# Patient Record
Sex: FEMALE | Race: WHITE | NOT HISPANIC OR LATINO | Employment: FULL TIME | URBAN - METROPOLITAN AREA
[De-identification: names, ages, dates, MRNs, and addresses within clinical notes are randomized per-mention and may not be internally consistent; named-entity substitution may affect disease eponyms.]

---

## 2017-05-26 ENCOUNTER — APPOINTMENT (EMERGENCY)
Dept: RADIOLOGY | Facility: HOSPITAL | Age: 27
End: 2017-05-26
Payer: COMMERCIAL

## 2017-05-26 ENCOUNTER — HOSPITAL ENCOUNTER (EMERGENCY)
Facility: HOSPITAL | Age: 27
Discharge: HOME | End: 2017-05-26
Attending: EMERGENCY MEDICINE | Admitting: EMERGENCY MEDICINE
Payer: COMMERCIAL

## 2017-05-26 VITALS
HEART RATE: 98 BPM | DIASTOLIC BLOOD PRESSURE: 70 MMHG | RESPIRATION RATE: 18 BRPM | SYSTOLIC BLOOD PRESSURE: 130 MMHG | TEMPERATURE: 98 F | WEIGHT: 120 LBS | OXYGEN SATURATION: 99 %

## 2017-05-26 DIAGNOSIS — S46.002A ROTATOR CUFF INJURY, LEFT, INITIAL ENCOUNTER: ICD-10-CM

## 2017-05-26 DIAGNOSIS — S46.812A TRAPEZIUS STRAIN, LEFT, INITIAL ENCOUNTER: Primary | ICD-10-CM

## 2017-05-26 PROCEDURE — 73030 X-RAY EXAM OF SHOULDER: CPT

## 2017-05-26 PROCEDURE — 99283 EMERGENCY DEPT VISIT LOW MDM: CPT

## 2017-05-26 PROCEDURE — 71020 HB CHEST X-RAY 2VW FRONTAL&LATL: CPT

## 2017-05-26 RX ORDER — NAPROXEN 500 MG/1
500 TABLET ORAL ONCE
Status: DISCONTINUED | OUTPATIENT
Start: 2017-05-26 | End: 2017-05-26 | Stop reason: HOSPADM

## 2017-05-26 RX ORDER — CYCLOBENZAPRINE HCL 10 MG
10 TABLET ORAL 3 TIMES DAILY PRN
Qty: 30 TABLET | Refills: 0 | Status: SHIPPED | OUTPATIENT
Start: 2017-05-26 | End: 2019-03-06 | Stop reason: ALTCHOICE

## 2017-05-26 RX ORDER — NAPROXEN 500 MG/1
500 TABLET ORAL 2 TIMES DAILY WITH MEALS
Qty: 30 TABLET | Refills: 0 | Status: SHIPPED | OUTPATIENT
Start: 2017-05-26 | End: 2019-03-06 | Stop reason: ALTCHOICE

## 2017-05-26 RX ORDER — NORETHINDRONE AND ETHINYL ESTRADIOL AND FERROUS FUMARATE 0.8-25(24)
KIT ORAL
COMMUNITY
Start: 2011-12-27 | End: 2018-09-27 | Stop reason: SDUPTHER

## 2017-11-07 ENCOUNTER — OFFICE VISIT (OUTPATIENT)
Dept: URGENT CARE | Age: 27
End: 2017-11-07
Payer: COMMERCIAL

## 2017-11-07 PROCEDURE — 87430 STREP A AG IA: CPT | Performed by: FAMILY MEDICINE

## 2017-11-07 PROCEDURE — S9088 SERVICES PROVIDED IN URGENT: HCPCS | Performed by: FAMILY MEDICINE

## 2017-11-07 PROCEDURE — 99203 OFFICE O/P NEW LOW 30 MIN: CPT | Performed by: FAMILY MEDICINE

## 2017-11-08 NOTE — PROGRESS NOTES
Assessment  1  Acute bronchitis (466 0) (J20 9)   2  Acute pharyngitis, unspecified etiology (462) (J02 9)    Plan  Acute pharyngitis, unspecified etiology    · Azithromycin 250 MG Oral Tablet (Zithromax Z-Tony); TAKE 2 TABLETS ON DAY 1  THEN TAKE 1 TABLET A DAY FOR 4 DAYS   · Ventolin  (90 Base) MCG/ACT Inhalation Aerosol Solution; INHALE 1 TO 2  PUFFS EVERY 4 TO 6 HOURS AS NEEDED    Discussion/Summary  Discussion Summary:   Take antibiotic as directed  Recommend daily probiotic while on antibiotic  Push fluids  Rest  If not improving over next 5-7 days recommend follow up with PCP  Warm salt water gargles for sore throat  Throat lozenges as needed  Ibuprofen as needed for sore throat  Understands and agrees with treatment plan: The treatment plan was reviewed with the patient/guardian  The patient/guardian understands and agrees with the treatment plan   Counseling Documentation With Imm: The patient was counseled regarding diagnostic results,-- instructions for management  Follow Up Instructions: Follow Up with your Primary Care Provider in 5-7 days  If your symptoms worsen, go to the nearest [facility] Emergency Department  Chief Complaint  1  Ear Pain   2  Sore Throat  Chief Complaint Free Text Note Form: sinus pressure, sore throat, bilateral ear pain and post nasal drip for 6 days  History of Present Illness  HPI: 51-year-old female that has been feeling poorly since last Wednesday  Started with a sore throat and now has headache ear pain head congestion  Has had persistent fever and chills  Rested for 4 days and states is not getting any better  Has gluten sensitivity /celiac disease  Tried over-the-counter medications but that seemed to bother her [facility] Based Practices Required Assessment:   Pain Assessment   the patient states they have pain   The patient describes the pain as aching  (on a scale of 0 to 10, the patient rates the pain at 5 )   Abuse And Domestic Violence Screen    Yes, the patient is safe at home  -- The patient states no one is hurting them  Depression And Suicide Screen  No, the patient has not had thoughts of hurting themself  No, the patient has not felt depressed in the past 7 days  Prefered Language is  english  Review of Systems  Focused-Female:   Constitutional: fever,-- feeling poorly-- and-- chills  ENT: sore throat-- and-- nasal discharge  Cardiovascular: no complaints of slow or fast heart rate, no chest pain, no palpitations, no leg claudication or lower extremity edema  Respiratory: shortness of breath-- and-- cough, but-- no complaints of shortness of breath, no wheezing, no dyspnea on exertion, no orthopnea or PND  Active Problems  1  Celiac disease (579 0) (K90 0)   2  Contraceptives (V25 02)   3  Dyspepsia (536 8) (K30)   4  Encounter for gynecological examination without abnormal finding (V72 31) (Z01 419)   5  GERD (gastroesophageal reflux disease) (530 81) (K21 9)   6  Other fatigue (780 79) (R53 83)   7  Screening for STD (sexually transmitted disease) (V74 5) (Z11 3)    Past Medical History  1  History of acute gastritis (V12 70) (Z87 19)   2  History of dental abscess (V12 79) (Z87 19)   3  History of Spontaneous  (634 90)   4  History of Spontaneous Pneumothorax (512 8)  Active Problems And Past Medical History Reviewed: The active problems and past medical history were reviewed and updated today  Family History  Father    1  Family history of Hypothyroidism  Brother    2  Family history of Diabetes Mellitus (V18 0)  Maternal Grandfather    3  Family history of liver cancer (V16 0) (Z80 0)   4  Family history of Hypertension (V17 49)  Family History Reviewed: The family history was reviewed and updated today         Social History   · Being A Social Drinker   · History of Current some day smoker (305 1) (F17 200)   · Currently sexually active   · Daily caffeinated coffee consumption   · Exercises regularly   · Never a smoker   · Oral contraceptives   · Denied: History of Uses drugs daily  Social History Reviewed: The social history was reviewed and updated today  Surgical History  1  Contraceptives (V25 02)   2  History of Esophagogastroduodenoscopy   3  History of Thoracoscopy (Therapeutic) With Pleurodesis  Surgical History Reviewed: The surgical history was reviewed and updated today  Current Meds   1  Norethin-Eth Estradiol-Fe 0 8-25 MG-MCG Oral Tablet Chewable; Therapy: ([id number]) to Recorded   2  Probiotic Oral Capsule; take 1 capsule daily; Therapy: 73JXE3096 to (Last Rx:11Nov2015) Ordered  Medication List Reviewed: The medication list was reviewed and updated today  Allergies  1  No Known Drug Allergies  2  Gluten    Vitals  Signs   Recorded: 20KGV8506 05:53PM   Temperature: 98 8 F, Temporal  Heart Rate: 97  Respiration: 20  Systolic: 404  Diastolic: 70  Height: 5 ft 5 in  Weight: 120 lb   BMI Calculated: 19 97  BSA Calculated: 1 59  O2 Saturation: 99  LMP: 19Bou6283  Pain Scale: 5    Physical Exam    Constitutional Well-developed well-nourished female appears acutely ill  Eyes   Conjunctiva and lids: No swelling, erythema or discharge  Pupils and irises: Equal, round and reactive to light  Ears, Nose, Mouth, and Throat   External inspection of ears and nose: Normal     Otoscopic examination: Tympanic membranes translucent with normal light reflex  Canals patent without erythema  -- nasal turbinates red and swollen  -- redness of the pharynx with cobblestoning  Pulmonary   Respiratory effort: No increased work of breathing or signs of respiratory distress  Auscultation of lungs: Abnormal  -- bronchial breath sounds throughout  Cardiovascular   Palpation of heart: Normal PMI, no thrills  Auscultation of heart: Normal rate and rhythm, normal S1 and S2, without murmurs  Skin   Skin and subcutaneous tissue: Normal without rashes or lesions  Psychiatric   Orientation to person, place, and time: Normal     Mood and affect: Normal        Future Appointments    Date/Time Provider Specialty Site   12/19/2017 08:20 AM Ham Vasquez, [address] Obstetrics/Gynecology [facility] OB     Signatures   Electronically signed by :  Lian Gorman; Nov 7 2017  6:14PM EST                       (Author)    Electronically signed by : Lisa Ramsey DO; Nov 7 2017  6:35PM EST                       (Co-author)

## 2017-11-13 ENCOUNTER — ALLSCRIPTS OFFICE VISIT (OUTPATIENT)
Dept: OTHER | Facility: OTHER | Age: 27
End: 2017-11-13

## 2017-11-14 NOTE — PROGRESS NOTES
Assessment    1  Acute bronchitis with bronchospasm (466 0) (J20 9)   2  Celiac disease (579 0) (K90 0)    Plan  Acute bronchitis with bronchospasm    · Benzonatate 100 MG Oral Capsule; tale 1 caps 3 times daily PRN FOR COUGH   · PredniSONE 10 MG Oral Tablet; tale 4 tab daily for 2 days, then 2 tab daily for 2days, then 1 tab daily for 2 days, then stop   · Ventolin  (90 Base) MCG/ACT Inhalation Aerosol Solution; INHALE 1TO 2 PUFFS EVERY 4 TO 6 HOURS AS NEEDED  Celiac disease    · Probiotic Oral Capsule; take 1 capsule daily    Discussion/Summary    1  Acute bronchitis with bronchospasm - patient completed antibiotic therapy with Zithromax yesterday  Will start on Prednisone taper  Use Ventolin HFA inhaler PRN for chest tightness, shortness of breath or wheezing  given for Tessalon 100 mg to take one capsule 3 times daily PRN for cough  fluid intake take  office if symptoms persist or worsen  disease âfollow a gluten-free diet  Take probiotic Align one capsule daily  The patient was counseled regarding instructions for management,-- risk factor reductions,-- risks and benefits of treatment options,-- importance of compliance with treatment  Possible side effects of new medications were reviewed with the patient/guardian today  The treatment plan was reviewed with the patient/guardian  The patient/guardian understands and agrees with the treatment plan      Chief Complaint  Patient seen at urgent care for Acute bronchitis and Acute pharyngitis, completed antibiotics  Complains of chest tightness, shortness of breath, fatigue, head congestion, upset stomach, and diarrhea off and on  History of Present Illness  HPI: Patient presents to the office c/o non- productive cough, chest tightness, SOB, feeling tired, nasal congestion  intermittent diarrhea  was seen at urgent care center on November 7, 2017 for acute bronchitis, acute pharyngitis, started on Zithromax for 5 days  last dose yesterday   She has been using Ventolin HFA inhaler PRN  prior history of asthma  has celiac disease  She takes probiotic Align one capsule daily  Review of Systems   Constitutional: feeling tired-- and-- low grade fever, but-- no chills  ENT: nasal congestion, but-- no earache,-- no nosebleeds,-- no sore throat,-- no hearing loss,-- no nasal discharge-- and-- no hoarseness  Cardiovascular: no chest pain-- and-- no palpitations  Respiratory: shortness of breath, but-- no wheezing--   The patient presents with complaints of cough, described as non-productive  Gastrointestinal: no abdominal pain,-- no nausea-- and-- no vomiting--   The patient presents with complaints of intermittent episodes of diarrhea  Genitourinary: no dysuria  Musculoskeletal: no arthralgias,-- no joint swelling-- and-- no myalgias  Integumentary: no rashes-- and-- no itching  Neurological: no dizziness--   The patient presents with complaints of mild headache  Active Problems  1  Acute bronchitis (466 0) (J20 9)   2  Acute pharyngitis, unspecified etiology (462) (J02 9)   3  Celiac disease (579 0) (K90 0)   4  Contraceptives (V25 02)   5  Dyspepsia (536 8) (K30)   6  Encounter for gynecological examination without abnormal finding (V72 31) (Z01 419)   7  GERD (gastroesophageal reflux disease) (530 81) (K21 9)   8  Other fatigue (780 79) (R53 83)   9  Screening for STD (sexually transmitted disease) (V74 5) (Z11 3)    Past Medical History  1  History of acute gastritis (V12 70) (Z87 19)   2  History of dental abscess (V12 79) (Z87 19)   3  History of Spontaneous  (634 90)   4  History of Spontaneous Pneumothorax (512 8)  Active Problems And Past Medical History Reviewed: The active problems and past medical history were reviewed and updated today  Family History  Father    1  Family history of Hypothyroidism  Brother    2  Family history of Diabetes Mellitus (V18 0)  Maternal Grandfather    3   Family history of liver cancer (V16 0) (Z80 0)   4  Family history of Hypertension (V17 49)    Social History   · Being A Social Drinker   · History of Current some day smoker (305 1) (F17 200)   · Currently sexually active   · Daily caffeinated coffee consumption   · Exercises regularly   · Never a smoker   · Oral contraceptives   · Denied: History of Uses drugs daily  The social history was reviewed and updated today  Surgical History    1  Contraceptives (V25 02)   2  History of Esophagogastroduodenoscopy   3  History of Thoracoscopy (Therapeutic) With Pleurodesis    Current Meds   1  Norethin-Eth Estradiol-Fe 0 8-25 MG-MCG Oral Tablet Chewable; Therapy: (([phone] [phone]) to Recorded   2  Probiotic Oral Capsule; take 1 capsule daily; Therapy: 08JZW5457 to (Last Rx:11Nov2015) Ordered   3  Ventolin  (90 Base) MCG/ACT Inhalation Aerosol Solution; INHALE 1 TO 2 PUFFS EVERY 4 TO 6 HOURS AS NEEDED; Therapy: 42IPX1193 to (Last WK:07MUD0628)  Requested for: 99PUD9580 Ordered    The medication list was reviewed and updated today  Allergies  1  No Known Drug Allergies  2  Gluten    Vitals   Recorded: 54ZPZ2915 11:34AM   Temperature 99 2 F, Tympanic   Heart Rate 88, L Radial   Respiration 16   Systolic 065, LUE   Diastolic 76, LUE   Height 5 ft 5 in   Weight 123 lb    BMI Calculated 20 47   BSA Calculated 1 61   Pain Scale 0       Physical Exam   Constitutional  General appearance: No acute distress, well appearing and well nourished  Eyes  Conjunctiva and lids: No swelling, erythema or discharge  Pupils and irises: Equal, round and reactive to light  Ears, Nose, Mouth, and Throat  External inspection of ears and nose: Normal    Nasal mucosa, septum, and turbinates: Abnormal   The bilateral nasal mucosa was edematous-- and-- red  Oropharynx: Normal with no erythema, edema, exudate or lesions  Pulmonary  Respiratory effort: No increased work of breathing or signs of respiratory distress     Auscultation of lungs: Abnormal  Auscultation of the lungs revealed coarse breath sounds BL  Cardiovascular  Auscultation of heart: Normal rate and rhythm, normal S1 and S2, without murmurs  Examination of extremities for edema and/or varicosities: Normal    Abdomen  Abdomen: Non-tender, no masses  Lymphatic  Palpation of lymph nodes in neck: No lymphadenopathy  Musculoskeletal  Gait and station: Normal    Digits and nails: Normal without clubbing or cyanosis  Inspection/palpation of joints, bones, and muscles: Normal    Skin  Skin and subcutaneous tissue: Normal without rashes or lesions           Future Appointments    Date/Time Provider Specialty Site   12/19/2017 08:20 AM Farida Dickerson [facility] Obstetrics/Gynecology [facility] OB       Signatures   Electronically signed by : DEANA Sunshine ; Nov 13 2017 12:05PM EST                       (Author)

## 2017-12-19 ENCOUNTER — ALLSCRIPTS OFFICE VISIT (OUTPATIENT)
Dept: OTHER | Facility: OTHER | Age: 27
End: 2017-12-19

## 2017-12-19 PROCEDURE — 87591 N.GONORRHOEAE DNA AMP PROB: CPT | Performed by: PHYSICIAN ASSISTANT

## 2017-12-19 PROCEDURE — 87491 CHLMYD TRACH DNA AMP PROBE: CPT | Performed by: PHYSICIAN ASSISTANT

## 2017-12-19 PROCEDURE — G0145 SCR C/V CYTO,THINLAYER,RESCR: HCPCS | Performed by: PHYSICIAN ASSISTANT

## 2017-12-21 ENCOUNTER — LAB REQUISITION (OUTPATIENT)
Dept: LAB | Facility: HOSPITAL | Age: 27
End: 2017-12-21
Payer: COMMERCIAL

## 2017-12-21 DIAGNOSIS — R87.612 LOW GRADE SQUAMOUS INTRAEPITHELIAL LESION ON CYTOLOGIC SMEAR OF CERVIX (LGSIL): ICD-10-CM

## 2017-12-22 LAB
CHLAMYDIA DNA CVX QL NAA+PROBE: NORMAL
N GONORRHOEA DNA GENITAL QL NAA+PROBE: NORMAL

## 2017-12-29 LAB
LAB AP GYN PRIMARY INTERPRETATION: NORMAL
Lab: NORMAL
PATH INTERP SPEC-IMP: NORMAL

## 2018-01-06 NOTE — PROGRESS NOTES
Assessment   1  Encounter for gynecological examination without abnormal finding (V72 31) (Z01 419)   2  Screening for STD (sexually transmitted disease) (V74 5) (Z11 3)   3  Low libido (799 81) (R60 82)    Plan    Encounter for gynecological examination without abnormal finding    · Follow-up visit in 1 year Evaluation and Treatment  Follow-up  Status: Complete  Done:    55YYL6956   Ordered; For: Encounter for gynecological examination without abnormal finding; Ordered By: Odell Hanson Performed:  Due: 91CLM2827; Last Updated By: Feliz Cantu; 12/19/2017 10:25:08 AM  Health Maintenance    · Norethin-Eth Estradiol-Fe 0 8-25 MG-MCG Oral Tablet Chewable; CHEW 1    TABLET DAILY   Rx By: Odell Hanson; Dispense: 90 Days ; #:90 Tablet Chewable; Refill: 3;For: Health Maintenance; DUSTIN = Y; Verified Transmission to [facility][id number] Last Updated By: System[employer] (Chapter 11); 12/19/2017 8:09:37 AM  Pap smear abnormality of cervix with LGSIL    · (1) THIN PREP PAP WITH IMAGING; Status: In Progress - Specimen/Data Collected;      Done: 49LTD1222   Perform:[facility]; Due:82Ubx3910; Ordered; For:Pap smear abnormality of cervix with LGSIL; Ordered By:Moises Wilburn; Maturation index required? : No  HPV? : if ASCUS  Screening for STD (sexually transmitted disease)    · (1) CHLAMYDIA/GC AMPLIFIED DNA, PCR; Source:Vaginal; Status: In Progress -    Specimen/Data Collected;   Done: 78UTY3504   Perform:[facility]; Due:59Yng5646; Ordered; For:Screening for STD (sexually transmitted disease); Ordered By:Moises Wilburn; Follow-up visit in 1 year Evaluation and Treatment  Follow-up  Status: Hold For - Scheduling  Requested for: 83FLQ1798     Ordered;       For: Encounter for gynecological examination without abnormal finding;  Ordered By: Odell Hanson  Performed:   Due: 57ZLE8271       Discussion/Summary   healthy adult female the risks and benefits of cervical cancer screening were discussed Pap test with reflex HPV testing was done today Testing was done today for chlamydia and gonorrhea  Breast cancer screening: the risks and benefits of breast cancer screening were discussed and monthly self breast exam was advised  Advice and education were given regarding weight bearing exercise, calcium supplements and vitamin D supplements  Chief Complaint   Pt is here for her yearly exam, she has no complaints  History of Present Illness   HPI: 24year old white female here for yearly exam, no complaints  She is doing very well on her current birth control and is happy with this  She has celiac disease and this pill is doing well with that  She is engaged to be   She is having a lot of issues with low libido  She remains very active with her boyfriend and 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 She has tried to talk to him about being more romantic  They live with his parents and his bedroom is a pass through so she is always worried about his parents walking into the room while they are having sex  They are trying to get their own home and hopefully that will help     reviewed her Pap history:  8/8/14 LGSIL 12/10/15 WNL   will repeat her Pap today  She elects STD cultures  GYN HM, Adult Female  RoxanaKennan Jhony: The patient is being seen for a health maintenance evaluation  The last health maintenance visit was 1 year(s) ago  General Health:      Lifestyle:  She consumes a diverse and healthy diet  -- She does not have any weight concerns  -- She exercises regularly  -- She does not use tobacco -- She consumes alcohol  She reports occasional alcohol use  -- She denies drug use  Reproductive health: the patient is premenopausal--   she reports no menstrual problems  -- she uses contraception  For contraception, she uses oral contraception pills  -- she is sexually active  -- she denies prior pregnancies      Screening: Cervical cancer screening includes a pap smear performed 12/10/2015,neg  -- and-- no previous human papilloma virus screening  Breast cancer screening includes no previous mammogram  Colorectal cancer screening includes no previous colonoscopy  Active Problems   1  Celiac disease (579 0) (K90 0)   2  Contraceptives (V25 02)   3  Dyspepsia (536 8) (K30)   4  Encounter for gynecological examination without abnormal finding (V72 31) (Z01 419)   5  GERD (gastroesophageal reflux disease) (530 81) (K21 9)   6  Screening for STD (sexually transmitted disease) (V74 5) (Z11 3)    Past Medical History    · History of acute gastritis (V12 70) (Z87 19)   · History of dental abscess (V12 79) (Z87 19)   · History of Spontaneous  (634 90)   · History of Spontaneous Pneumothorax (512 8)    Surgical History    · Contraceptives (V25 02)   · History of Esophagogastroduodenoscopy   · History of Thoracoscopy (Therapeutic) With Pleurodesis    Family History   Father    · Family history of Hypothyroidism  Brother    · Family history of Diabetes Mellitus (V18 0)  Maternal Grandfather    · Family history of liver cancer (V16 0) (Z80 0)   · Family history of Hypertension (V17 49)    Social History    · Being A Social Drinker   · History of Current some day smoker (305 1) (F17 200)   · Currently sexually active   · Daily caffeinated coffee consumption   · Exercises regularly   · Never a smoker   · Oral contraceptives   · Denied: History of Uses drugs daily    Current Meds    1  Norethin-Eth Estradiol-Fe 0 8-25 MG-MCG Oral Tablet Chewable; CHEW 1 TABLET     DAILY  Requested for: 2017; Last Rx:2017 Ordered   2  Probiotic Oral Capsule; take 1 capsule daily; Therapy: 67MAE3855 to (Last Rx:2015) Ordered    Allergies   1  No Known Drug Allergies  2   Gluten    Vitals    Recorded: 85MNO7440 56:25AB   Systolic 061, LUE, Sitting   Diastolic 64, LUE, Sitting   Height 5 ft 5 in   Weight 121 lb    BMI Calculated 20 14   BSA Calculated 1 6   LMP 20PRT4945     Physical Exam        Constitutional      General appearance: No acute distress, well appearing and well nourished  Neck      Neck: Normal, supple, trachea midline, no masses  Genitourinary      External genitalia: Normal and no lesions appreciated  Vagina: Normal, no lesions or dryness appreciated  Urethra: Normal        Urethral meatus: Normal        Bladder: Normal, soft, non-tender and no prolapse or masses appreciated  Cervix: Normal, no palpable masses  Uterus: Normal, non-tender, not enlarged, and no palpable masses  Adnexa/parametria: Normal, non-tender and no fullness or masses appreciated  Chest      Breasts: Normal and no dimpling or skin changes noted  Abdomen      Abdomen: Normal, non-tender, and no organomegaly noted  Lymphatic      Palpation of lymph nodes in neck, axillae, groin and/or other locations: No lymphadenopathy or masses noted         Future Appointments      Date/Time Provider Specialty Site   12/20/2018 08:00 AM Nayeli Roldan [facility] Obstetrics/Gynecology [facility] OB     Signatures    Electronically signed by : Steve Lerner [facility]; Dec 19 2017  9:14AM EST                       (Author)     Electronically signed by : DEANA Villareal ; Jan 5 2018  5:18PM EST                       (Author)

## 2018-01-09 NOTE — MISCELLANEOUS
Message  Return to work or school:   Adarsh Holcomb is under my professional care   She was seen in my office on 11/13/2017   She is able to return to work on  11/16/2017            Signatures   Electronically signed by : DEANA Howard ; Nov 13 2017 12:07PM EST                       (Author)

## 2018-01-13 VITALS
RESPIRATION RATE: 16 BRPM | BODY MASS INDEX: 20.49 KG/M2 | SYSTOLIC BLOOD PRESSURE: 120 MMHG | HEIGHT: 65 IN | DIASTOLIC BLOOD PRESSURE: 76 MMHG | WEIGHT: 123 LBS | TEMPERATURE: 99.2 F | HEART RATE: 88 BPM

## 2018-01-19 ENCOUNTER — GENERIC CONVERSION - ENCOUNTER (OUTPATIENT)
Dept: OTHER | Facility: OTHER | Age: 28
End: 2018-01-19

## 2018-01-22 VITALS
SYSTOLIC BLOOD PRESSURE: 110 MMHG | BODY MASS INDEX: 20.16 KG/M2 | WEIGHT: 121 LBS | DIASTOLIC BLOOD PRESSURE: 64 MMHG | HEIGHT: 65 IN

## 2018-01-23 NOTE — MISCELLANEOUS
Message   Recorded as Task   Date: 2018 07:40 AM, Created By: Jun Preston   Task Name: Go to Result   Assigned To: Jami Apgar   Regarding Patient: Ileana Esqueda, Status: In Progress   Comment:    Shalonda Wilburn - 2018 7:40 AM     TASK CREATED  please let Ning Steve know that her Pap showed a low grade change but the pathologist cannot be sure that something more significant is not going on  She needs a colpo  Her cultures are neg  Kaylen Trujillo - 2018 7:50 AM     TASK IN PROGRESS   Kaylen Trujillo - 2018 7:52 AM     TASK EDITED  [facility] for pt to cb       ext: [id number]   Kaylen Trujillo - 2018 8:05 AM     TASK EDITED  Patient returned call - she is aware of pap results and culture results  Explained to her that she will need a colpo due to the pap results  Explained why she needed it  Explained what a colpo was and how it is done  Advised her to take 2-3 Ibuprofen an hour before hand  Made an appointment for her for  with [facility] in [place]  Active Problems    1  Celiac disease (579 0) (K90 0)   2  Contraceptives (V25 02)   3  Dyspepsia (536 8) (K30)   4  Encounter for gynecological examination without abnormal finding (V72 31) (Z01 419)   5  GERD (gastroesophageal reflux disease) (530 81) (K21 9)   6  Low libido (799 81) (R68 82)   7  Pap smear abnormality of cervix with LGSIL (795 03) (R87 612)   8  Screening for STD (sexually transmitted disease) (V74 5) (Z11 3)    Current Meds   1  Norethin-Eth Estradiol-Fe 0 8-25 MG-MCG Oral Tablet Chewable; CHEW 1 TABLET   DAILY  Requested for: 49HGP6672; Last Rx:66Ouh2278 Ordered   2  Probiotic Oral Capsule; take 1 capsule daily; Therapy: 89GAC6964 to (Last Rx:2015) Ordered    Allergies    1  No Known Drug Allergies    2   Gluten    Signatures   Electronically signed by : Gilford Jourdain, ; 2018  8:05AM EST                       (Author)

## 2018-01-31 ENCOUNTER — OFFICE VISIT (OUTPATIENT)
Dept: OBGYN CLINIC | Facility: CLINIC | Age: 28
End: 2018-01-31
Payer: COMMERCIAL

## 2018-01-31 VITALS — SYSTOLIC BLOOD PRESSURE: 112 MMHG | WEIGHT: 122.4 LBS | DIASTOLIC BLOOD PRESSURE: 60 MMHG | BODY MASS INDEX: 20.37 KG/M2

## 2018-01-31 DIAGNOSIS — R87.612 LGSIL ON PAP SMEAR OF CERVIX: Primary | ICD-10-CM

## 2018-01-31 PROBLEM — J20.9 ACUTE BRONCHITIS WITH BRONCHOSPASM: Status: ACTIVE | Noted: 2017-11-13

## 2018-01-31 PROBLEM — J20.9 ACUTE BRONCHITIS: Status: ACTIVE | Noted: 2017-11-07

## 2018-01-31 PROBLEM — J02.9 ACUTE PHARYNGITIS: Status: ACTIVE | Noted: 2017-11-07

## 2018-01-31 PROBLEM — R68.82 LOW LIBIDO: Status: ACTIVE | Noted: 2017-12-19

## 2018-01-31 PROCEDURE — 88305 TISSUE EXAM BY PATHOLOGIST: CPT | Performed by: OBSTETRICS & GYNECOLOGY

## 2018-01-31 PROCEDURE — 88344 IMHCHEM/IMCYTCHM EA MLT ANTB: CPT | Performed by: OBSTETRICS & GYNECOLOGY

## 2018-01-31 PROCEDURE — 99999 PR OFFICE/OUTPT VISIT,PROCEDURE ONLY: CPT | Performed by: OBSTETRICS & GYNECOLOGY

## 2018-01-31 PROCEDURE — 57454 BX/CURETT OF CERVIX W/SCOPE: CPT | Performed by: OBSTETRICS & GYNECOLOGY

## 2018-01-31 PROCEDURE — 88344 IMHCHEM/IMCYTCHM EA MLT ANTB: CPT | Performed by: PATHOLOGY

## 2018-01-31 PROCEDURE — 88305 TISSUE EXAM BY PATHOLOGIST: CPT | Performed by: PATHOLOGY

## 2018-01-31 RX ORDER — NORETHINDRONE AND ETHINYL ESTRADIOL AND FERROUS FUMARATE 0.8-25(24)
1 KIT ORAL DAILY
COMMUNITY
End: 2019-03-06 | Stop reason: SDUPTHER

## 2018-01-31 NOTE — PROGRESS NOTES
Colposcopy Procedure Note  Indications: Pap smear   December 29, 2017 low-grade squamous intraepithelial neoplasia (LGSIL - encompassing HPV,mild dysplasia,JOSE I)  Prior cervical/vaginal disease: none  Prior cervical treatment none    Procedure Details   The risks and benefits of the procedure and Verbal informed consent obtained  Speculum placed in vagina and excellent visualization of cervix achieved, cervix swabbed x 3 with acetic acid solution  Findings:  Cervix: no visible lesions; cervix swabbed with Lugol's solution  Normal T zone  Vaginal inspection: vaginal colposcopy not performed  Specimens: ECC and cervical biopsy at 12 oclock    Complications: none  Plan:  Specimens labelled and sent to Pathology

## 2018-02-02 ENCOUNTER — TELEPHONE (OUTPATIENT)
Dept: OBGYN CLINIC | Facility: CLINIC | Age: 28
End: 2018-02-02

## 2018-02-07 ENCOUNTER — TELEPHONE (OUTPATIENT)
Dept: OBGYN CLINIC | Facility: CLINIC | Age: 28
End: 2018-02-07

## 2018-02-07 PROBLEM — D06.9 SEVERE DYSPLASIA OF CERVIX (CIN III): Status: ACTIVE | Noted: 2018-02-07

## 2018-02-07 NOTE — TELEPHONE ENCOUNTER
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

## 2018-03-05 ENCOUNTER — PROCEDURE VISIT (OUTPATIENT)
Dept: OBGYN CLINIC | Facility: CLINIC | Age: 28
End: 2018-03-05
Payer: COMMERCIAL

## 2018-03-05 DIAGNOSIS — D06.9 CIN III WITH SEVERE DYSPLASIA: Primary | ICD-10-CM

## 2018-03-05 PROCEDURE — 88307 TISSUE EXAM BY PATHOLOGIST: CPT | Performed by: OBSTETRICS & GYNECOLOGY

## 2018-03-05 PROCEDURE — 57522 CONIZATION OF CERVIX: CPT | Performed by: OBSTETRICS & GYNECOLOGY

## 2018-03-05 PROCEDURE — 88307 TISSUE EXAM BY PATHOLOGIST: CPT | Performed by: PATHOLOGY

## 2018-03-05 NOTE — PATIENT INSTRUCTIONS
Loop Electrosurgical Excision Procedure   WHAT YOU NEED TO KNOW:   A loop electrosurgical excision procedure (LEEP) is used to remove abnormal tissue from your cervix or vagina  Your cervix is the opening of your uterus  Your healthcare provider will use a small wire loop that is heated by an electrical current to remove the tissue  DISCHARGE INSTRUCTIONS:   Medicines: You may need any of the following:  · Acetaminophen  decreases pain  It is available without a doctor's order  Ask how much to take and how often to take it  Follow directions  Acetaminophen can cause liver damage if not taken correctly  · NSAIDs  decrease pain and swelling  This medicine is available without a doctor's order  This medicine can cause stomach bleeding or kidney problems  If you take blood thinner medicine, always ask your healthcare provider if NSAIDs are safe for you  Always read the medicine label and follow the directions on it before you use this medicine  · Take your medicine as directed  Contact your healthcare provider if you think your medicine is not helping or if you have side effects  Tell him if you are allergic to any medicine  Keep a list of the medicines, vitamins, and herbs you take  Include the amounts, and when and why you take them  Bring the list or the pill bottles to follow-up visits  Carry your medicine list with you in case of an emergency  Follow up with your healthcare provider or gynecologist as directed:  Write down your questions so you remember to ask them during your visits  Rest:  Rest when you feel it is needed  Slowly start to do more each day  Return to your daily activities as directed  Vaginal care: It is normal to have mild cramping, spotting, or discharge for several days after your procedure  You may also have a thin, watery discharge for up to 4 weeks after your procedure  Use a clean sanitary pad as needed   Do not  use tampons, douche, or have sex until your healthcare provider or gynecologist says that it is okay  Bathing:  Do not take a bath or use a hot tub for 2 weeks after your procedure, or as directed by your healthcare provider or gynecologist  Reji Rod may shower during this time  Contact your healthcare provider or gynecologist if:   · You have a fever or chills  · You have nausea or are vomiting  · You have blood in your urine  · Your pad becomes soaked with blood  · You have foul-smelling drainage from your vagina  · You have pain when you urinate or have sex  · You have questions or concerns about your condition or care  Seek care immediately or call 911 if:   · You have heavy bleeding from your vagina  · You have severe abdominal or vaginal pain that does not go away, even after you take pain medicine  · You are urinating less than before your procedure  © 2016 [employer] is for End User's use only and may not be sold, redistributed or otherwise used for commercial purposes  All illustrations and images included in CareNotes® are the copyrighted property of [employer]  or Augustus Rivera  The above information is an  only  It is not intended as medical advice for individual conditions or treatments  Talk to your doctor, nurse or pharmacist before following any medical regimen to see if it is safe and effective for you

## 2018-03-29 ENCOUNTER — TELEPHONE (OUTPATIENT)
Dept: OBGYN CLINIC | Facility: CLINIC | Age: 28
End: 2018-03-29

## 2018-03-29 NOTE — TELEPHONE ENCOUNTER
Pt had a leep 3 weeks ago and wanted to know when she may have sex  Said she had no po appt   I gave her an appt next week

## 2018-04-05 ENCOUNTER — OFFICE VISIT (OUTPATIENT)
Dept: OBGYN CLINIC | Facility: CLINIC | Age: 28
End: 2018-04-05

## 2018-04-05 VITALS — WEIGHT: 122.4 LBS | SYSTOLIC BLOOD PRESSURE: 114 MMHG | DIASTOLIC BLOOD PRESSURE: 64 MMHG | BODY MASS INDEX: 20.37 KG/M2

## 2018-04-05 DIAGNOSIS — Z09 POSTOPERATIVE EXAMINATION: Primary | ICD-10-CM

## 2018-04-05 PROCEDURE — 99024 POSTOP FOLLOW-UP VISIT: CPT | Performed by: OBSTETRICS & GYNECOLOGY

## 2018-04-05 NOTE — PROGRESS NOTES
Check after her LEEP procedure  For JOSE 3  Margins were marked at not being free  The patient had no bleeding or discharge her odor      Physical exam:  Normal vulva vagina  Well healed cervix no bleeding  Normal anteverted flexed uterus  No adnexal masses    Impression:  The postop recovery  Status post JOSE 3    Plan:  Pap smear in 6 months for follow-up

## 2018-09-27 DIAGNOSIS — Z01.419 ENCOUNTER FOR GYNECOLOGICAL EXAMINATION WITHOUT ABNORMAL FINDING: Primary | ICD-10-CM

## 2018-09-27 RX ORDER — NORETHINDRONE AND ETHINYL ESTRADIOL AND FERROUS FUMARATE 0.8-25(24)
1 KIT ORAL DAILY
Qty: 90 TABLET | Refills: 2 | Status: SHIPPED | OUTPATIENT
Start: 2018-09-27 | End: 2019-03-06 | Stop reason: ALTCHOICE

## 2018-10-01 ENCOUNTER — TELEPHONE (OUTPATIENT)
Dept: OBGYN CLINIC | Facility: CLINIC | Age: 28
End: 2018-10-01

## 2018-10-01 NOTE — TELEPHONE ENCOUNTER
Pt on a generic of generess fe  Since her LEEP  In May, she has 3 days of spotting mid cycle - every month  No missed or late pills  Change pills?   Thank you

## 2018-10-02 NOTE — TELEPHONE ENCOUNTER
Pt wants to hold off on changing the pill she has celiac disease and is worried on the ingredients , told her she can look up the ingredients but she wants to hold off and see if these bleeding sx work its way out

## 2018-10-02 NOTE — TELEPHONE ENCOUNTER
Please prescribe Loestrin 1 5/30 but make sure she is aware that this is a 21 day pill, not 24 days

## 2019-03-06 ENCOUNTER — ANNUAL EXAM (OUTPATIENT)
Dept: OBGYN CLINIC | Facility: CLINIC | Age: 29
End: 2019-03-06
Payer: COMMERCIAL

## 2019-03-06 VITALS
HEIGHT: 65 IN | SYSTOLIC BLOOD PRESSURE: 116 MMHG | WEIGHT: 131 LBS | BODY MASS INDEX: 21.83 KG/M2 | DIASTOLIC BLOOD PRESSURE: 72 MMHG

## 2019-03-06 DIAGNOSIS — Z01.419 ENCNTR FOR GYN EXAM (GENERAL) (ROUTINE) W/O ABN FINDINGS: ICD-10-CM

## 2019-03-06 PROBLEM — J20.9 ACUTE BRONCHITIS: Status: RESOLVED | Noted: 2017-11-07 | Resolved: 2019-03-06

## 2019-03-06 PROBLEM — J20.9 ACUTE BRONCHITIS WITH BRONCHOSPASM: Status: RESOLVED | Noted: 2017-11-13 | Resolved: 2019-03-06

## 2019-03-06 PROBLEM — J02.9 ACUTE PHARYNGITIS: Status: RESOLVED | Noted: 2017-11-07 | Resolved: 2019-03-06

## 2019-03-06 PROBLEM — R87.612 PAP SMEAR ABNORMALITY OF CERVIX WITH LGSIL: Status: RESOLVED | Noted: 2017-12-19 | Resolved: 2019-03-06

## 2019-03-06 PROCEDURE — 87624 HPV HI-RISK TYP POOLED RSLT: CPT | Performed by: PHYSICIAN ASSISTANT

## 2019-03-06 PROCEDURE — G0145 SCR C/V CYTO,THINLAYER,RESCR: HCPCS | Performed by: PATHOLOGY

## 2019-03-06 PROCEDURE — 99395 PREV VISIT EST AGE 18-39: CPT | Performed by: PHYSICIAN ASSISTANT

## 2019-03-06 PROCEDURE — G0124 SCREEN C/V THIN LAYER BY MD: HCPCS | Performed by: PATHOLOGY

## 2019-03-06 RX ORDER — LORATADINE 10 MG/1
10 TABLET ORAL DAILY
COMMUNITY

## 2019-03-06 RX ORDER — NORETHINDRONE AND ETHINYL ESTRADIOL AND FERROUS FUMARATE 0.8-25(24)
1 KIT ORAL DAILY
Qty: 90 TABLET | Refills: 3 | Status: SHIPPED | OUTPATIENT
Start: 2019-03-06 | End: 2020-02-27 | Stop reason: SDUPTHER

## 2019-03-06 NOTE — PROGRESS NOTES
Tristan Akins  1990    CC:  Yearly exam    S:  34 y o  female here for yearly exam  Her cycles are regular, not heavy or crampy  She is sexually active  She uses Generess generic for contraception  She does not request STD testing today  She has the same boyfriend  We reviewed her history of JOSE II on LEEP in 4/2018 with positive margins; she was supposed to return in 6 months for Pap but never returned - she says that she was called by our office and told she could just come back in a year  We discussed the need to maintain very close follow-up for her abnormal Pap and that if things were to worsen it could impact her ability to have children in the future and that cervical cancer and become a life-threatening disease      She has had Gardasil in the past      Pap 12/19/17 LGSIL cannot r/o HGSIL (treat as ASC-H)  Colpo 1/31/18 JOSE II-III  LEEP 3/5/18 JOSE II, positive margins    Current Outpatient Medications:     loratadine (CLARITIN) 10 mg tablet, Take 10 mg by mouth daily, Disp: , Rfl:     Norethin-Eth Estradiol-Fe 0 8-25 MG-MCG CHEW, Chew 1 tablet daily, Disp: , Rfl:     Probiotic Product (PROBIOTIC DAILY PO), Take 1 capsule by mouth daily, Disp: , Rfl:   Social History     Socioeconomic History    Marital status: Single     Spouse name: Not on file    Number of children: Not on file    Years of education: Not on file    Highest education level: Not on file   Occupational History    Not on file   Social Needs    Financial resource strain: Not on file    Food insecurity:     Worry: Not on file     Inability: Not on file    Transportation needs:     Medical: Not on file     Non-medical: Not on file   Tobacco Use    Smoking status: Current Every Day Smoker     Packs/day: 0 34    Smokeless tobacco: Never Used    Tobacco comment: never a smoker per All[employer]   Substance and Sexual Activity    Alcohol use: Yes     Frequency: Monthly or less     Drinks per session: 1 or 2     Binge frequency: Never     Comment: social drinker per [employer]    Drug use: Never    Sexual activity: Yes     Partners: Male     Birth control/protection: Pill     Comment: contraceptives / oral contraceptives   Lifestyle    Physical activity:     Days per week: Not on file     Minutes per session: Not on file    Stress: Not on file   Relationships    Social connections:     Talks on phone: Not on file     Gets together: Not on file     Attends Scient[employer] service: Not on file     Active member of club or organization: Not on file     Attends meetings of clubs or organizations: Not on file     Relationship status: Not on file    Intimate partner violence:     Fear of current or ex partner: Not on file     Emotionally abused: Not on file     Physically abused: Not on file     Forced sexual activity: Not on file   Other Topics Concern    Not on file   Social History Narrative    Daily caffeinated coffee consumption    Exercises regularly     Family History   Problem Relation Age of Onset    Hypothyroidism Father     Diabetes Brother     Liver cancer Maternal Grandfather     Hypertension Maternal Grandfather      Past Medical History:   Diagnosis Date    Abnormal Pap smear of cervix     Celiac disease     Dental abscess     Spontaneous      Spontaneous pneumothorax     right pneumothorax, treated with right VATS, bleb resection, apical pleurectomy 2012         O:  Blood pressure 116/72, height 5' 5 35" (1 66 m), weight 59 4 kg (131 lb), last menstrual period 2019  Patient appears well and is not in distress  Neck is supple without masses  Breasts are symmetrical without mass, tenderness, nipple discharge, skin changes or adenopathy  Abdomen is soft and nontender without masses  External genitals are normal without lesions or rashes  Vagina is normal without discharge or bleeding  Cervix is normal without discharge or lesion     Uterus is normal, mobile, nontender without palpable mass   Adnexa are normal, nontender, without palpable mass  A:   Yearly exam     History of JOSE III    P:  Pap and HPV collected today  If neg/neg, co-testing in 2    years per [facility] guidelines  If HPV + will need    repeat colposcopy    Generess sent to pharmacy    RTO one year for yearly exam or sooner as needed

## 2019-03-08 LAB
HPV HR 12 DNA CVX QL NAA+PROBE: POSITIVE
HPV16 DNA CVX QL NAA+PROBE: NEGATIVE
HPV18 DNA CVX QL NAA+PROBE: NEGATIVE

## 2019-03-12 ENCOUNTER — TELEPHONE (OUTPATIENT)
Dept: OBGYN CLINIC | Facility: CLINIC | Age: 29
End: 2019-03-12

## 2019-03-12 LAB
LAB AP GYN PRIMARY INTERPRETATION: ABNORMAL
Lab: ABNORMAL
PATH INTERP SPEC-IMP: ABNORMAL

## 2019-03-12 NOTE — TELEPHONE ENCOUNTER
Spoke with pt - she is aware of her pap results and that she needs to have a colpo done  Since she had one done last year, is aware on how it is done  Advised to take 2-3 Ibuprofen an hour before hand  Scheduled her with [facility] for this Thursday in [facility]

## 2019-03-12 NOTE — TELEPHONE ENCOUNTER
----- Message from dE Eldridge PA-C sent at 3/12/2019  2:51 PM EDT -----  Pap with HGSIL, had LEEP last year  Needs repeat colpo

## 2019-03-14 ENCOUNTER — PROCEDURE VISIT (OUTPATIENT)
Dept: OBGYN CLINIC | Facility: CLINIC | Age: 29
End: 2019-03-14
Payer: COMMERCIAL

## 2019-03-14 VITALS — DIASTOLIC BLOOD PRESSURE: 68 MMHG | WEIGHT: 128 LBS | BODY MASS INDEX: 21.07 KG/M2 | SYSTOLIC BLOOD PRESSURE: 138 MMHG

## 2019-03-14 DIAGNOSIS — Z32.02 PREGNANCY TEST NEGATIVE: ICD-10-CM

## 2019-03-14 DIAGNOSIS — B97.7 HPV (HUMAN PAPILLOMA VIRUS) INFECTION: ICD-10-CM

## 2019-03-14 DIAGNOSIS — R87.619 ATYPICAL GLANDULAR CELLS OF UNDETERMINED SIGNIFICANCE (AGUS) ON CERVICAL PAP SMEAR: ICD-10-CM

## 2019-03-14 DIAGNOSIS — R87.613 HGSIL (HIGH GRADE SQUAMOUS INTRAEPITHELIAL LESION) ON PAP SMEAR OF CERVIX: Primary | ICD-10-CM

## 2019-03-14 LAB — SL AMB POCT URINE HCG: NORMAL

## 2019-03-14 PROCEDURE — 57454 BX/CURETT OF CERVIX W/SCOPE: CPT | Performed by: OBSTETRICS & GYNECOLOGY

## 2019-03-14 PROCEDURE — 88344 IMHCHEM/IMCYTCHM EA MLT ANTB: CPT | Performed by: PATHOLOGY

## 2019-03-14 PROCEDURE — 58110 BX DONE W/COLPOSCOPY ADD-ON: CPT | Performed by: OBSTETRICS & GYNECOLOGY

## 2019-03-14 PROCEDURE — 81025 URINE PREGNANCY TEST: CPT | Performed by: OBSTETRICS & GYNECOLOGY

## 2019-03-14 PROCEDURE — 88305 TISSUE EXAM BY PATHOLOGIST: CPT | Performed by: PATHOLOGY

## 2019-03-14 NOTE — PROGRESS NOTES
Endometrial biopsy  Date/Time: 3/14/2019 1:37 PM  Performed by: Jose Eduardo Wayne MD  Authorized by: Jose Eduardo Wayne MD     Consent:     Consent obtained:  Verbal and written    Consent given by:  Patient    Procedural risks discussed:  Bleeding, infection and possible continued pain    Patient questions answered: yes      Patient agrees, verbalizes understanding, and wants to proceed: yes    Indication:     Indications comment:  NILSA on Pap  Procedure:     Procedure: endometrial biopsy with Pipelle      A bivalve speculum was placed in the vagina: yes      Cervix cleaned and prepped: yes      The cervix was dilated: no      Uterus sounded: yes      Uterus sound depth (cm):  9    Specimen collected: specimen collected and sent to pathology      Patient tolerated procedure well with no complications: yes    Findings:     Uterus size:  9-10 weeks    Cervix comment:  Posterior lip shortened  Colposcopy  Date/Time: 3/14/2019 1:39 PM  Performed by: Jose Eduardo Wayne MD  Authorized by: Jose Eduardo Wayne MD     Consent:     Consent obtained:  Verbal and written    Consent given by:  Patient    Procedural risks discussed:  Bleeding, infection and possible continued pain    Patient questions answered: yes      Patient agrees, verbalizes understanding, and wants to proceed: yes    Indication:     Indication:  HSIL  Procedure:     Procedure: Colposcopy w/ cervical biopsy and ECC      [place] speculum was placed in the vagina: yes      Under colposcopic examination the transition zone was seen in entirety: yes      Endocervix was curetted using a Kevorkian curette: yes      Cervical biopsy performed with a cervical biopsy punch: yes      Monsel's solution was applied: yes      Biopsy(s): yes      Location:  ECC and 7 o'clock    Specimen to pathology: yes    Post-procedure:     Findings: Mosaicism, Punctation and White epithelium      Impression: High grade cervical dysplasia      Patient tolerance of procedure:   Tolerated well, no immediate complications

## 2019-03-19 ENCOUNTER — TELEPHONE (OUTPATIENT)
Dept: OBGYN CLINIC | Facility: CLINIC | Age: 29
End: 2019-03-19

## 2019-03-20 ENCOUNTER — CONSULT (OUTPATIENT)
Dept: OBGYN CLINIC | Facility: CLINIC | Age: 29
End: 2019-03-20
Payer: COMMERCIAL

## 2019-03-20 VITALS — BODY MASS INDEX: 21.73 KG/M2 | DIASTOLIC BLOOD PRESSURE: 76 MMHG | SYSTOLIC BLOOD PRESSURE: 124 MMHG | WEIGHT: 132 LBS

## 2019-03-20 DIAGNOSIS — D06.9 SEVERE DYSPLASIA OF CERVIX (CIN III): Primary | ICD-10-CM

## 2019-03-20 PROCEDURE — 99214 OFFICE O/P EST MOD 30 MIN: CPT | Performed by: OBSTETRICS & GYNECOLOGY

## 2019-03-20 NOTE — PROGRESS NOTES
Pre-op History and Physical  Patient is a 34 y o  female scheduled for cervical conization  Indications for procedure are persistent JOSE III with endocervical gland involvement and positive ECC  Patient initially treated by LEEP 2018; however, margins were positive and patient was noncompliant with follow up  Repeat Pap was NILSA  Patient underwent colposcopic-directed biopsies, ECC, and EMB  Pertinent Gynecological History:  Menses: flow is light and with minimal cramping  Bleeding: monthly  Contraception: OCP (estrogen/progesterone)  BALJIT exposure: denies  Blood transfusions: none      Discussed Blood/Blood Products: yes    Menstrual History:  OB History        1    Para        Term                AB   1    Living   0       SAB   1    TAB        Ectopic        Multiple        Live Births                   Patient's last menstrual period was 2019 (exact date)       Past Medical History:   Diagnosis Date    Abnormal Pap smear of cervix     Celiac disease     Dental abscess     Spontaneous      Spontaneous pneumothorax     right pneumothorax, treated with right VATS, bleb resection, apical pleurectomy 2012       Past Surgical History:   Procedure Laterality Date    CERVICAL BIOPSY  W/ LOOP ELECTRODE EXCISION      COLPOSCOPY      ESOPHAGOGASTRODUODENOSCOPY  2015    THORACOSCOPY      (Therapeutic) with pleurodesis         Current Outpatient Medications:     loratadine (CLARITIN) 10 mg tablet, Take 10 mg by mouth daily, Disp: , Rfl:     Norethin-Eth Estradiol-Fe 0 8-25 MG-MCG CHEW, Chew 1 tablet daily, Disp: 90 tablet, Rfl: 3    Probiotic Product (PROBIOTIC DAILY PO), Take 1 capsule by mouth daily, Disp: , Rfl:     Allergies   Allergen Reactions    Gluten Meal        Family History   Problem Relation Age of Onset    Hypothyroidism Father     Diabetes Brother     Liver cancer Maternal Grandfather     Hypertension Maternal Grandfather        Social History Tobacco Use    Smoking status: Current Every Day Smoker     Packs/day: 0 34    Smokeless tobacco: Never Used    Tobacco comment: never a smoker per Allscript   Substance Use Topics    Alcohol use: Yes     Frequency: Monthly or less     Drinks per session: 1 or 2     Binge frequency: Never     Comment: social drinker per Allscript    Drug use: Never       Review of Systems   Constitution: Negative for decreased appetite, fever, malaise/fatigue and weight loss  Hematologic/Lymphatic: Does not bruise/bleed easily  Musculoskeletal: Negative for back pain, joint pain, muscle weakness and myalgias  Gastrointestinal: Negative for bloating, nausea and vomiting  Genitourinary: Negative for flank pain, hesitancy, non-menstrual bleeding, pelvic pain and urgency  Psychiatric/Behavioral: Negative for depression  All other systems reviewed and are negative  Vitals:    03/20/19 0853   BP: 124/76       Physical Exam   Constitutional: She is oriented to person, place, and time  She appears well-developed and well-nourished  Genitourinary: Vagina normal  Cervix does not exhibit lesion, discharge or friability  Genitourinary Comments: Posterior lip of cervix shortened   HENT:   Head: Normocephalic  Cardiovascular: Normal rate, regular rhythm and normal heart sounds  Pulmonary/Chest: Effort normal and breath sounds normal    Abdominal: Soft  She exhibits no distension  There is no tenderness  Lymphadenopathy:        Right: No inguinal adenopathy present  Left: No inguinal adenopathy present  Neurological: She is alert and oriented to person, place, and time  Skin: Skin is warm and dry  Psychiatric: She has a normal mood and affect  Vitals reviewed  Pathology 3/14/2019 from colpo biopsies:  A    Endocervix, curettage:-  Scant atypical squamous epithelium with features most compatible with at least low-grade dysplasia (LSIL/JOSE 1) and scant reactive appearing endocervical glandular epithelium    B   Cervix, 8:00, biopsy:  -  High-grade squamous intraepithelial lesion (HSIL/JOSE 2-3) with background low grade dysplasia (LSIL/JOSE 1) involving the transformation zone with focal involvement of endocervical glands  -  Multiplex immunohistochemical stain performed with appropriate control highlights foci of strong positive staining for p16 with increased Ki-67 proliferative activity into at least the middle portion of the epithelium, supporting the diagnosis    C   Endometrium, biopsy:  -  Benign endometrium with extensive breakdown change, negative for hyperplasia or carcinoma  Impresssion:  Persistent JOSE III with endocervical gland involvement and positive ECC s/p LEEP    Plan:  Cold knife conization of cervix  Discussed risks of bleeding, infection, and pathology requiring further surgery with GYN/ONC  All questions answered  Consent signed

## 2019-03-21 ENCOUNTER — TELEPHONE (OUTPATIENT)
Dept: OBGYN CLINIC | Facility: CLINIC | Age: 29
End: 2019-03-21

## 2019-03-25 ENCOUNTER — TELEPHONE (OUTPATIENT)
Dept: OBGYN CLINIC | Facility: CLINIC | Age: 29
End: 2019-03-25

## 2019-03-27 PROBLEM — R87.613 HIGH GRADE SQUAMOUS INTRAEPITHELIAL CERVICAL DYSPLASIA: Status: ACTIVE | Noted: 2019-03-27

## 2019-03-28 ENCOUNTER — TELEPHONE (OUTPATIENT)
Dept: OBGYN CLINIC | Facility: CLINIC | Age: 29
End: 2019-03-28

## 2019-03-28 NOTE — TELEPHONE ENCOUNTER
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

## 2019-03-29 ENCOUNTER — APPOINTMENT (OUTPATIENT)
Dept: LAB | Age: 29
End: 2019-03-29
Payer: COMMERCIAL

## 2019-03-29 DIAGNOSIS — Z01.818 PREOP TESTING: ICD-10-CM

## 2019-03-29 LAB
ANION GAP SERPL CALCULATED.3IONS-SCNC: 6 MMOL/L (ref 4–13)
BUN SERPL-MCNC: 12 MG/DL (ref 5–25)
CALCIUM SERPL-MCNC: 8.8 MG/DL (ref 8.3–10.1)
CHLORIDE SERPL-SCNC: 106 MMOL/L (ref 100–108)
CO2 SERPL-SCNC: 25 MMOL/L (ref 21–32)
CREAT SERPL-MCNC: 0.79 MG/DL (ref 0.6–1.3)
ERYTHROCYTE [DISTWIDTH] IN BLOOD BY AUTOMATED COUNT: 13.6 % (ref 11.6–15.1)
GFR SERPL CREATININE-BSD FRML MDRD: 104 ML/MIN/1.73SQ M
GLUCOSE P FAST SERPL-MCNC: 107 MG/DL (ref 65–99)
HCT VFR BLD AUTO: 42.3 % (ref 34.8–46.1)
HGB BLD-MCNC: 14.1 G/DL (ref 11.5–15.4)
MCH RBC QN AUTO: 32.3 PG (ref 26.8–34.3)
MCHC RBC AUTO-ENTMCNC: 33.3 G/DL (ref 31.4–37.4)
MCV RBC AUTO: 97 FL (ref 82–98)
PLATELET # BLD AUTO: 336 THOUSANDS/UL (ref 149–390)
PMV BLD AUTO: 9.2 FL (ref 8.9–12.7)
POTASSIUM SERPL-SCNC: 3.5 MMOL/L (ref 3.5–5.3)
RBC # BLD AUTO: 4.36 MILLION/UL (ref 3.81–5.12)
SODIUM SERPL-SCNC: 137 MMOL/L (ref 136–145)
WBC # BLD AUTO: 8.37 THOUSAND/UL (ref 4.31–10.16)

## 2019-03-29 PROCEDURE — 36415 COLL VENOUS BLD VENIPUNCTURE: CPT

## 2019-03-29 PROCEDURE — 85027 COMPLETE CBC AUTOMATED: CPT

## 2019-03-29 PROCEDURE — 80048 BASIC METABOLIC PNL TOTAL CA: CPT

## 2019-03-29 NOTE — TELEPHONE ENCOUNTER
Patient has a LEEP scheduled with you on 4/3  She would like to know how long she should be out from her job

## 2019-04-03 ENCOUNTER — ANESTHESIA EVENT (OUTPATIENT)
Dept: PERIOP | Facility: HOSPITAL | Age: 29
End: 2019-04-03
Payer: COMMERCIAL

## 2019-04-03 ENCOUNTER — HOSPITAL ENCOUNTER (OUTPATIENT)
Facility: HOSPITAL | Age: 29
Setting detail: OUTPATIENT SURGERY
Discharge: HOME | End: 2019-04-03
Attending: OBSTETRICS & GYNECOLOGY | Admitting: OBSTETRICS & GYNECOLOGY
Payer: COMMERCIAL

## 2019-04-03 ENCOUNTER — ANESTHESIA (OUTPATIENT)
Dept: PERIOP | Facility: HOSPITAL | Age: 29
End: 2019-04-03
Payer: COMMERCIAL

## 2019-04-03 VITALS
DIASTOLIC BLOOD PRESSURE: 61 MMHG | SYSTOLIC BLOOD PRESSURE: 114 MMHG | OXYGEN SATURATION: 100 % | HEIGHT: 65 IN | HEART RATE: 73 BPM | TEMPERATURE: 98.4 F | RESPIRATION RATE: 18 BRPM | WEIGHT: 120 LBS | BODY MASS INDEX: 19.99 KG/M2

## 2019-04-03 DIAGNOSIS — Z98.890 S/P CONE BIOPSY OF CERVIX: Primary | ICD-10-CM

## 2019-04-03 DIAGNOSIS — R87.613 HIGH GRADE SQUAMOUS INTRAEPITHELIAL CERVICAL DYSPLASIA: ICD-10-CM

## 2019-04-03 LAB — EXT PREGNANCY TEST URINE: NEGATIVE

## 2019-04-03 PROCEDURE — 88305 TISSUE EXAM BY PATHOLOGIST: CPT | Performed by: PATHOLOGY

## 2019-04-03 PROCEDURE — 88344 IMHCHEM/IMCYTCHM EA MLT ANTB: CPT | Performed by: PATHOLOGY

## 2019-04-03 PROCEDURE — 81025 URINE PREGNANCY TEST: CPT | Performed by: OBSTETRICS & GYNECOLOGY

## 2019-04-03 PROCEDURE — 88307 TISSUE EXAM BY PATHOLOGIST: CPT | Performed by: PATHOLOGY

## 2019-04-03 PROCEDURE — 57522 CONIZATION OF CERVIX: CPT | Performed by: OBSTETRICS & GYNECOLOGY

## 2019-04-03 RX ORDER — PROPOFOL 10 MG/ML
INJECTION, EMULSION INTRAVENOUS CONTINUOUS PRN
Status: DISCONTINUED | OUTPATIENT
Start: 2019-04-03 | End: 2019-04-03 | Stop reason: SURG

## 2019-04-03 RX ORDER — IBUPROFEN 200 MG
600 TABLET ORAL EVERY 6 HOURS PRN
Refills: 0
Start: 2019-04-03 | End: 2020-03-13 | Stop reason: ALTCHOICE

## 2019-04-03 RX ORDER — FENTANYL CITRATE/PF 50 MCG/ML
25 SYRINGE (ML) INJECTION
Status: DISCONTINUED | OUTPATIENT
Start: 2019-04-03 | End: 2019-04-03 | Stop reason: HOSPADM

## 2019-04-03 RX ORDER — LIDOCAINE HYDROCHLORIDE 10 MG/ML
1 INJECTION, SOLUTION EPIDURAL; INFILTRATION; INTRACAUDAL; PERINEURAL ONCE
Status: COMPLETED | OUTPATIENT
Start: 2019-04-03 | End: 2019-04-03

## 2019-04-03 RX ORDER — SODIUM CHLORIDE, SODIUM LACTATE, POTASSIUM CHLORIDE, CALCIUM CHLORIDE 600; 310; 30; 20 MG/100ML; MG/100ML; MG/100ML; MG/100ML
75 INJECTION, SOLUTION INTRAVENOUS CONTINUOUS
Status: DISCONTINUED | OUTPATIENT
Start: 2019-04-03 | End: 2019-04-03

## 2019-04-03 RX ORDER — PROPOFOL 10 MG/ML
INJECTION, EMULSION INTRAVENOUS AS NEEDED
Status: DISCONTINUED | OUTPATIENT
Start: 2019-04-03 | End: 2019-04-03

## 2019-04-03 RX ORDER — PROPOFOL 10 MG/ML
INJECTION, EMULSION INTRAVENOUS AS NEEDED
Status: DISCONTINUED | OUTPATIENT
Start: 2019-04-03 | End: 2019-04-03 | Stop reason: SURG

## 2019-04-03 RX ORDER — FERRIC SUBSULFATE 0.21 G/G
LIQUID TOPICAL AS NEEDED
Status: DISCONTINUED | OUTPATIENT
Start: 2019-04-03 | End: 2019-04-03 | Stop reason: HOSPADM

## 2019-04-03 RX ORDER — MIDAZOLAM HYDROCHLORIDE 1 MG/ML
INJECTION INTRAMUSCULAR; INTRAVENOUS AS NEEDED
Status: DISCONTINUED | OUTPATIENT
Start: 2019-04-03 | End: 2019-04-03 | Stop reason: SURG

## 2019-04-03 RX ORDER — FENTANYL CITRATE 50 UG/ML
INJECTION, SOLUTION INTRAMUSCULAR; INTRAVENOUS AS NEEDED
Status: DISCONTINUED | OUTPATIENT
Start: 2019-04-03 | End: 2019-04-03 | Stop reason: SURG

## 2019-04-03 RX ORDER — ACETAMINOPHEN 325 MG/1
650 TABLET ORAL EVERY 6 HOURS PRN
Status: DISCONTINUED | OUTPATIENT
Start: 2019-04-03 | End: 2019-04-03 | Stop reason: HOSPADM

## 2019-04-03 RX ORDER — SODIUM CHLORIDE, SODIUM LACTATE, POTASSIUM CHLORIDE, CALCIUM CHLORIDE 600; 310; 30; 20 MG/100ML; MG/100ML; MG/100ML; MG/100ML
100 INJECTION, SOLUTION INTRAVENOUS CONTINUOUS
Status: DISCONTINUED | OUTPATIENT
Start: 2019-04-03 | End: 2019-04-03 | Stop reason: HOSPADM

## 2019-04-03 RX ORDER — DEXAMETHASONE SODIUM PHOSPHATE 10 MG/ML
INJECTION, SOLUTION INTRAMUSCULAR; INTRAVENOUS AS NEEDED
Status: DISCONTINUED | OUTPATIENT
Start: 2019-04-03 | End: 2019-04-03 | Stop reason: SURG

## 2019-04-03 RX ORDER — OXYCODONE HYDROCHLORIDE AND ACETAMINOPHEN 5; 325 MG/1; MG/1
1 TABLET ORAL EVERY 4 HOURS PRN
Status: DISCONTINUED | OUTPATIENT
Start: 2019-04-03 | End: 2019-04-03 | Stop reason: HOSPADM

## 2019-04-03 RX ORDER — ONDANSETRON 2 MG/ML
4 INJECTION INTRAMUSCULAR; INTRAVENOUS ONCE AS NEEDED
Status: DISCONTINUED | OUTPATIENT
Start: 2019-04-03 | End: 2019-04-03 | Stop reason: HOSPADM

## 2019-04-03 RX ORDER — IODINE SOLUTION STRONG 5% (LUGOL'S) 5 %
SOLUTION ORAL AS NEEDED
Status: DISCONTINUED | OUTPATIENT
Start: 2019-04-03 | End: 2019-04-03 | Stop reason: HOSPADM

## 2019-04-03 RX ORDER — ONDANSETRON 2 MG/ML
INJECTION INTRAMUSCULAR; INTRAVENOUS AS NEEDED
Status: DISCONTINUED | OUTPATIENT
Start: 2019-04-03 | End: 2019-04-03 | Stop reason: SURG

## 2019-04-03 RX ADMIN — FENTANYL CITRATE 25 MCG: 50 INJECTION, SOLUTION INTRAMUSCULAR; INTRAVENOUS at 15:22

## 2019-04-03 RX ADMIN — SODIUM CHLORIDE, SODIUM LACTATE, POTASSIUM CHLORIDE, AND CALCIUM CHLORIDE 75 ML/HR: .6; .31; .03; .02 INJECTION, SOLUTION INTRAVENOUS at 13:00

## 2019-04-03 RX ADMIN — LIDOCAINE HYDROCHLORIDE 0.5 ML: 10 INJECTION, SOLUTION INFILTRATION; PERINEURAL at 13:00

## 2019-04-03 RX ADMIN — FENTANYL CITRATE 25 MCG: 50 INJECTION, SOLUTION INTRAMUSCULAR; INTRAVENOUS at 15:27

## 2019-04-03 RX ADMIN — PROPOFOL 100 MCG/KG/MIN: 10 INJECTION, EMULSION INTRAVENOUS at 15:15

## 2019-04-03 RX ADMIN — SODIUM CHLORIDE, SODIUM LACTATE, POTASSIUM CHLORIDE, AND CALCIUM CHLORIDE: .6; .31; .03; .02 INJECTION, SOLUTION INTRAVENOUS at 15:12

## 2019-04-03 RX ADMIN — FENTANYL CITRATE 25 MCG: 50 INJECTION, SOLUTION INTRAMUSCULAR; INTRAVENOUS at 15:15

## 2019-04-03 RX ADMIN — SODIUM CHLORIDE, SODIUM LACTATE, POTASSIUM CHLORIDE, AND CALCIUM CHLORIDE 100 ML/HR: .6; .31; .03; .02 INJECTION, SOLUTION INTRAVENOUS at 16:18

## 2019-04-03 RX ADMIN — MIDAZOLAM 2 MG: 1 INJECTION INTRAMUSCULAR; INTRAVENOUS at 15:12

## 2019-04-03 RX ADMIN — PROPOFOL 70 MG: 10 INJECTION, EMULSION INTRAVENOUS at 15:15

## 2019-04-03 RX ADMIN — DEXAMETHASONE SODIUM PHOSPHATE 5 MG: 10 INJECTION, SOLUTION INTRAMUSCULAR; INTRAVENOUS at 15:23

## 2019-04-03 RX ADMIN — FENTANYL CITRATE 25 MCG: 50 INJECTION, SOLUTION INTRAMUSCULAR; INTRAVENOUS at 15:28

## 2019-04-03 RX ADMIN — ONDANSETRON 4 MG: 2 INJECTION INTRAMUSCULAR; INTRAVENOUS at 15:23

## 2019-04-08 ENCOUNTER — TELEPHONE (OUTPATIENT)
Dept: OBGYN CLINIC | Facility: CLINIC | Age: 29
End: 2019-04-08

## 2019-04-22 ENCOUNTER — TELEPHONE (OUTPATIENT)
Dept: OBGYN CLINIC | Facility: CLINIC | Age: 29
End: 2019-04-22

## 2019-04-30 ENCOUNTER — OFFICE VISIT (OUTPATIENT)
Dept: OBGYN CLINIC | Facility: CLINIC | Age: 29
End: 2019-04-30

## 2019-04-30 VITALS — BODY MASS INDEX: 21.17 KG/M2 | DIASTOLIC BLOOD PRESSURE: 70 MMHG | SYSTOLIC BLOOD PRESSURE: 142 MMHG | WEIGHT: 127.2 LBS

## 2019-04-30 DIAGNOSIS — Z09 POSTOP CHECK: Primary | ICD-10-CM

## 2019-04-30 PROCEDURE — 99024 POSTOP FOLLOW-UP VISIT: CPT | Performed by: OBSTETRICS & GYNECOLOGY

## 2019-08-26 ENCOUNTER — OFFICE VISIT (OUTPATIENT)
Dept: URGENT CARE | Age: 29
End: 2019-08-26
Payer: COMMERCIAL

## 2019-08-26 VITALS
BODY MASS INDEX: 20.66 KG/M2 | SYSTOLIC BLOOD PRESSURE: 136 MMHG | DIASTOLIC BLOOD PRESSURE: 73 MMHG | WEIGHT: 124 LBS | HEIGHT: 65 IN | OXYGEN SATURATION: 99 % | TEMPERATURE: 98.6 F | HEART RATE: 79 BPM | RESPIRATION RATE: 20 BRPM

## 2019-08-26 DIAGNOSIS — L42 PITYRIASIS ROSEA: Primary | ICD-10-CM

## 2019-08-26 PROCEDURE — S9088 SERVICES PROVIDED IN URGENT: HCPCS | Performed by: PHYSICIAN ASSISTANT

## 2019-08-26 PROCEDURE — 99213 OFFICE O/P EST LOW 20 MIN: CPT | Performed by: PHYSICIAN ASSISTANT

## 2019-08-27 NOTE — PATIENT INSTRUCTIONS
Avoid heat exposure    Follow-up with the primary care physician as needed    May use topical lotions or ointment as needed

## 2019-10-24 ENCOUNTER — OFFICE VISIT (OUTPATIENT)
Dept: OBGYN CLINIC | Facility: CLINIC | Age: 29
End: 2019-10-24
Payer: COMMERCIAL

## 2019-10-24 VITALS — SYSTOLIC BLOOD PRESSURE: 128 MMHG | WEIGHT: 126.2 LBS | DIASTOLIC BLOOD PRESSURE: 72 MMHG | BODY MASS INDEX: 21 KG/M2

## 2019-10-24 DIAGNOSIS — D06.9 SEVERE DYSPLASIA OF CERVIX (CIN III): Primary | ICD-10-CM

## 2019-10-24 PROCEDURE — G0145 SCR C/V CYTO,THINLAYER,RESCR: HCPCS | Performed by: OBSTETRICS & GYNECOLOGY

## 2019-10-24 PROCEDURE — 99213 OFFICE O/P EST LOW 20 MIN: CPT | Performed by: OBSTETRICS & GYNECOLOGY

## 2019-10-24 NOTE — PROGRESS NOTES
Subjective     Nidhi Nelson is a 34 y o  woman who comes in today for a  pap smear only  Had JOSE III on colpo-directed biopsies followed by negative LEEP/ECC  Contraception: OCP (estrogen/progesterone)    The following portions of the patient's history were reviewed and updated as appropriate: allergies, current medications, past family history, past medical history, past social history, past surgical history and problem list     Review of Systems  Pertinent items are noted in HPI       Objective     /72 (BP Location: Right arm, Patient Position: Sitting, Cuff Size: Standard)   Wt 57 2 kg (126 lb 3 2 oz)   LMP 10/21/2019 (Exact Date)   BMI 21 00 kg/m²   Pelvic Exam: external genitalia normal, vagina normal without discharge and cervix normal in appearance  Pap smear obtained  Assessment/Plan     Screening pap smear  Follow up in 6 months, or as indicated by Pap results

## 2019-10-30 LAB
LAB AP GYN PRIMARY INTERPRETATION: NORMAL
LAB AP LMP: NORMAL
Lab: NORMAL

## 2019-11-01 ENCOUNTER — TELEPHONE (OUTPATIENT)
Dept: OBGYN CLINIC | Facility: CLINIC | Age: 29
End: 2019-11-01

## 2020-02-27 DIAGNOSIS — Z01.419 ENCNTR FOR GYN EXAM (GENERAL) (ROUTINE) W/O ABN FINDINGS: ICD-10-CM

## 2020-02-27 RX ORDER — NORETHINDRONE AND ETHINYL ESTRADIOL AND FERROUS FUMARATE 0.8-25(24)
1 KIT ORAL DAILY
Qty: 28 TABLET | Refills: 0 | Status: SHIPPED | OUTPATIENT
Start: 2020-02-27 | End: 2020-03-13 | Stop reason: SDUPTHER

## 2020-03-13 ENCOUNTER — ANNUAL EXAM (OUTPATIENT)
Dept: OBGYN CLINIC | Facility: CLINIC | Age: 30
End: 2020-03-13
Payer: COMMERCIAL

## 2020-03-13 VITALS
SYSTOLIC BLOOD PRESSURE: 120 MMHG | DIASTOLIC BLOOD PRESSURE: 72 MMHG | WEIGHT: 123 LBS | BODY MASS INDEX: 20.49 KG/M2 | HEIGHT: 65 IN

## 2020-03-13 DIAGNOSIS — Z01.419 ENCNTR FOR GYN EXAM (GENERAL) (ROUTINE) W/O ABN FINDINGS: Primary | ICD-10-CM

## 2020-03-13 DIAGNOSIS — D06.9 SEVERE DYSPLASIA OF CERVIX (CIN III): ICD-10-CM

## 2020-03-13 DIAGNOSIS — Z11.3 SCREEN FOR STD (SEXUALLY TRANSMITTED DISEASE): ICD-10-CM

## 2020-03-13 PROBLEM — R87.613 HIGH GRADE SQUAMOUS INTRAEPITHELIAL CERVICAL DYSPLASIA: Status: RESOLVED | Noted: 2019-03-27 | Resolved: 2020-03-13

## 2020-03-13 PROCEDURE — G0145 SCR C/V CYTO,THINLAYER,RESCR: HCPCS | Performed by: PHYSICIAN ASSISTANT

## 2020-03-13 PROCEDURE — 99395 PREV VISIT EST AGE 18-39: CPT | Performed by: PHYSICIAN ASSISTANT

## 2020-03-13 PROCEDURE — 87624 HPV HI-RISK TYP POOLED RSLT: CPT | Performed by: PHYSICIAN ASSISTANT

## 2020-03-13 PROCEDURE — 87491 CHLMYD TRACH DNA AMP PROBE: CPT | Performed by: PHYSICIAN ASSISTANT

## 2020-03-13 PROCEDURE — 87591 N.GONORRHOEAE DNA AMP PROB: CPT | Performed by: PHYSICIAN ASSISTANT

## 2020-03-13 RX ORDER — NORETHINDRONE AND ETHINYL ESTRADIOL AND FERROUS FUMARATE 0.8-25(24)
1 KIT ORAL DAILY
Qty: 90 TABLET | Refills: 3 | Status: SHIPPED | OUTPATIENT
Start: 2020-03-13 | End: 2021-04-02 | Stop reason: SDUPTHER

## 2020-03-13 NOTE — PROGRESS NOTES
Rasta Payne  1990    CC:  Yearly exam    S:  34 y o  female here for yearly exam  Her cycles are regular, not heavy or crampy  Sexual activity: She is sexually active without pain, bleeding or dryness  Contraception:  She uses Generess for contraception  STD testing:  She does not request STD testing today  We reviewed [facility] guidelines for Pap testing  Pap 12/10/15 neg  Pap 12/19/17 LGSIL r/o HGSIL  Colpo 1/31/18 JOSE 2-3  LEEP 3/5/18 JOSE 2, + margins  Pap 3/6/19 HGSIL, NILSA  Colpo 3/14/19 JOSE I  [facility] bx 4/3/19 benign  Pap 10/24/19 neg (no HPV run)    Family hx of breast cancer: no  Family hx of ovarian cancer: no  Family hx of colon cancer: no    She does not know if she will ever want children, but for now she does not       Current Outpatient Medications:     loratadine (CLARITIN) 10 mg tablet, Take 10 mg by mouth daily, Disp: , Rfl:     Norethin-Eth Estradiol-Fe 0 8-25 MG-MCG CHEW, Chew 1 tablet daily, Disp: 28 tablet, Rfl: 0    Probiotic Product (PROBIOTIC DAILY PO), Take 1 capsule by mouth daily, Disp: , Rfl:   Social History     Socioeconomic History    Marital status: Single     Spouse name: Not on file    Number of children: Not on file    Years of education: Not on file    Highest education level: Not on file   Occupational History    Not on file   Social Needs    Financial resource strain: Not on file    Food insecurity:     Worry: Not on file     Inability: Not on file    Transportation needs:     Medical: Not on file     Non-medical: Not on file   Tobacco Use    Smoking status: Current Every Day Smoker     Packs/day: 0 50    Smokeless tobacco: Never Used   Substance and Sexual Activity    Alcohol use: Yes     Frequency: Never     Drinks per session: 1 or 2     Binge frequency: Never    Drug use: Yes     Types: Marijuana    Sexual activity: Yes     Partners: Male     Birth control/protection: Pill     Comment: contraceptives / oral contraceptives   Lifestyle  Physical activity:     Days per week: Not on file     Minutes per session: Not on file    Stress: Not on file   Relationships    Social connections:     Talks on phone: Not on file     Gets together: Not on file     Attends Mosque service: Not on file     Active member of club or organization: Not on file     Attends meetings of clubs or organizations: Not on file     Relationship status: Not on file    Intimate partner violence:     Fear of current or ex partner: Not on file     Emotionally abused: Not on file     Physically abused: Not on file     Forced sexual activity: Not on file   Other Topics Concern    Not on file   Social History Narrative    Daily caffeinated coffee consumption    Exercises regularly     Family History   Problem Relation Age of Onset    Hypothyroidism Father     Diabetes Brother     Liver cancer Maternal Grandfather     Hypertension Maternal Grandfather      Past Medical History:   Diagnosis Date    Abnormal Pap smear of cervix     Celiac disease     Celiac disease     Dental abscess     HPV (human papilloma virus) infection     Spontaneous      Spontaneous pneumothorax     right pneumothorax, treated with right VATS, bleb resection, apical pleurectomy 2012       Review of Systems   Respiratory: Negative  Cardiovascular: Negative  Gastrointestinal: Negative for constipation and diarrhea  Genitourinary: Negative for difficulty urinating, pelvic pain, vaginal bleeding, vaginal discharge, itching or odor  O:  Blood pressure 120/72, height 5' 4 96" (1 65 m), weight 55 8 kg (123 lb), last menstrual period 03/10/2020  Patient appears well and is not in distress  Neck is supple without masses  Breasts are symmetrical without mass, tenderness, nipple discharge, skin changes or adenopathy  Abdomen is soft and nontender without masses  External genitals are normal without lesions or rashes    Urethra and urethral meatus are normal  Bladder is normal to palpation  Vagina is normal without discharge or bleeding  Cervix is normal without discharge or lesion  Uterus is normal, mobile, nontender without palpable mass  Adnexa are normal, nontender, without palpable mass  A:   Yearly exam     Hx JOSE III    P:   Pap and HPV today   Generess sent to pharmacy     RTO one year for yearly exam or sooner as needed

## 2020-03-15 LAB
HPV HR 12 DNA CVX QL NAA+PROBE: NEGATIVE
HPV16 DNA CVX QL NAA+PROBE: NEGATIVE
HPV18 DNA CVX QL NAA+PROBE: NEGATIVE

## 2020-03-16 LAB
C TRACH DNA SPEC QL NAA+PROBE: NEGATIVE
N GONORRHOEA DNA SPEC QL NAA+PROBE: NEGATIVE

## 2020-03-17 LAB
LAB AP GYN PRIMARY INTERPRETATION: NORMAL
Lab: NORMAL

## 2020-10-06 NOTE — PROGRESS NOTES
Jerrlyn Peabody  YOB: 1990        Preop Diagnosis: JOSE 2-3    Postop Diagnosis : same    Anesthesia: IV propofol, IV ketorolac    Procedure: LEEP    Specimen: ectocervix for routine pathologic studies  Loop electrode size: 25 by 15 mm    Blood loss was: minimal    Prior to procedure informed consent was obtained, alternatives were discussed, patient's questions were answered    In the dorsal lithotomy position, anesthesia was induced with IV propofol by the nurse anesthesist    Bimanual exam was done with normal findings  The vulva and vagina were prepped with betadine solution    A Leep bivalve speculum with smoke evacuation tube was placed in the vagina  The cervix was identified  Acetic acid 5% was applied, Lugol's solution was then applied as well  Infiltration of the cervical stroma circumferentially with lidocaine 2% with epinephrine for a total of 5 ml was performed  The loop electrode with monopolar cutting was used to excise the affected area of the cervix  The specimen was handed over  Monopolar electro cautery was applied to the defect on the cervix for hemostasis  Monsel's solution was applied as well  Hemostasis was present  The instruments were removed  Ramón Willett was awakened of anesthesia  She tolerated the procedure well  Ary name: Manohar Aguayo  Age 21 yrs    Indication for ICU admission:  Spinal drain  Admit Date:  10/2  ICU Date:      10/3  OR Date:      10/3    No Known Allergies    PAST MEDICAL & SURGICAL HISTORY:  Anxiety  S/P ACL repair  x2  History of tonsillectomy  Home Medications:       22 y/o male with recurrent CSF leak on POD #7  (had been repaired at time of initial surgery)  Now s/p primary repair of dura , application of duraseal and a fat plug  A spinal drain was placed for intermittant drainage of CSF    24 HOUR EVENTS:  ON: No acute events, got Clonapin in 6pm, low grade tachy to 106, gave lactulose 10 x1. Cr remains 1.0.     During the Day:  HOB to 90 degrees  Continued hourly drainage for spinal drain with goal of 10 cc/hr  Clonidine 0.5 mg for anxiety ( home med)      DVT PTX: heparin   Injectable 7500 Unit(s) SubCutaneous every 8 hours    GI PTX: pantoprazole    Tablet 40 milliGRAM(s) Oral before breakfast    ***Tubes/Lines/Drains  ***  Peripheral IV    [ x]A ten-point review of systems was otherwise negative except as noted above.  [ ]Due to altered mental status/intubation, subjective information was not attained from the patient.  History was obtained, to the extent possible, from review of the chart and collateral sources of information.    Ary name: Manohar Aguayo  Age 21 yrs    Indication for ICU admission:  Spinal drain  Admit Date:  10/2  ICU Date:      10/3  OR Date:      10/3    No Known Allergies    PAST MEDICAL & SURGICAL HISTORY:  Anxiety  S/P ACL repair  x2  History of tonsillectomy  Home Medications:       20 y/o male with recurrent CSF leak on POD #11 (had been repaired at time of initial surgery)  Now POD#3 s/p primary repair of dura , application of duraseal and a fat plug  A spinal drain was placed for intermittant drainage of CSF    24 HOUR EVENTS:  ON: No acute events, got Clonapin in 6pm, low grade tachy to 106, gave lactulose 10 x1. Cr remains 1.0.     During the Day:  HOB to 90 degrees  Continued hourly drainage for spinal drain with goal of 10 cc/hr  Clonidine 0.5 mg for anxiety ( home med)      DVT PTX: heparin   Injectable 7500 Unit(s) SubCutaneous every 8 hours    GI PTX: pantoprazole    Tablet 40 milliGRAM(s) Oral before breakfast    ***Tubes/Lines/Drains  ***  Peripheral IV    [ x]A ten-point review of systems was otherwise negative except as noted above.  [ ]Due to altered mental status/intubation, subjective information was not attained from the patient.  History was obtained, to the extent possible, from review of the chart and collateral sources of information.       Daily     Daily Weight in k (06 Oct 2020 05:00)    Diet, Regular (10-03-20 @ 12:55)      CURRENT MEDS:  Neurologic Medications  acetaminophen   Tablet .. 650 milliGRAM(s) Oral every 6 hours  cyclobenzaprine 10 milliGRAM(s) Oral <User Schedule> PRN Muscle Spasm  diazepam    Tablet 5 milliGRAM(s) Oral <User Schedule>  melatonin 5 milliGRAM(s) Oral at bedtime  oxyCODONE    IR 5 milliGRAM(s) Oral every 6 hours PRN Moderate Pain (4 - 6)  oxyCODONE    IR 10 milliGRAM(s) Oral every 6 hours PRN Severe Pain (7 - 10)    Respiratory Medications    Cardiovascular Medications    Gastrointestinal Medications  bisacodyl 5 milliGRAM(s) Oral at bedtime  lactulose Syrup 10 Gram(s) Oral every 8 hours  multivitamin 1 Tablet(s) Oral daily  pantoprazole    Tablet 40 milliGRAM(s) Oral before breakfast  polyethylene glycol 3350 17 Gram(s) Oral daily  senna 2 Tablet(s) Oral at bedtime    Genitourinary Medications    Hematologic/Oncologic Medications  heparin   Injectable 7500 Unit(s) SubCutaneous every 8 hours  influenza   Vaccine 0.5 milliLiter(s) IntraMuscular once    Antimicrobial/Immunologic Medications  ceFAZolin   IVPB 1000 milliGRAM(s) IV Intermittent every 8 hours    Endocrine/Metabolic Medications    Topical/Other Medications  chlorhexidine 4% Liquid 1 Application(s) Topical <User Schedule>      ICU Vital Signs Last 24 Hrs  T(C): 35.7 (06 Oct 2020 08:00), Max: 37.3 (05 Oct 2020 20:00)  T(F): 96.2 (06 Oct 2020 08:00), Max: 99.2 (05 Oct 2020 20:00)  HR: 99 (06 Oct 2020 11:15) (80 - 124)  BP: 148/76 (06 Oct 2020 11:15) (114/62 - 162/88)  BP(mean): 104 (06 Oct 2020 11:15) (81 - 111)  RR: 18 (06 Oct 2020 08:00) (16 - 22)  SpO2: 100% (06 Oct 2020 11:15) (97% - 100%)      Adult Advanced Hemodynamics Last 24 Hrs      I&O's Summary    05 Oct 2020 07:  -  06 Oct 2020 07:00  --------------------------------------------------------  IN: 1240 mL / OUT: 2995 mL / NET: -1755 mL    06 Oct 2020 07:  -  06 Oct 2020 11:37  --------------------------------------------------------  IN: 0 mL / OUT: 40 mL / NET: -40 mL      I&O's Detail    05 Oct 2020 07:01  -  06 Oct 2020 07:00  --------------------------------------------------------  IN:    Oral Fluid: 1240 mL  Total IN: 1240 mL    OUT:    External Ventricular Device (mL): 245 mL    Voided (mL): 2750 mL  Total OUT: 2995 mL    Total NET: -1755 mL      06 Oct 2020 07:01  -  06 Oct 2020 11:37  --------------------------------------------------------  IN:  Total IN: 0 mL    OUT:    External Ventricular Device (mL): 40 mL  Total OUT: 40 mL    Total NET: -40 mL          PHYSICAL EXAM:    General/Neuro  GCS: 15  Alert & oriented x 3, no focal deficits. No acute distress       Lungs: Clear to auscultation, Normal expansion/effort.     Cardiovascular : S1, S2.  Regular rate and rhythm.  Peripheral edema       GI: Abdomen soft, Non-tender, Non-distended.  BS+    Wound: Lumbar drain in place. No surrounding erythema or swelling     Extremities: Extremities warm, pink, well-perfused. 5/5 strength in BUE and 4/5 BLE    Derm: Good skin turgor, no skin breakdown.      : Deferred. Voiding       CXR:     LABS:  CAPILLARY BLOOD GLUCOSE                              13.3   12.69 )-----------( 291      ( 06 Oct 2020 00:09 )             39.8       10-    137  |  105  |  23<H>  ----------------------------<  113<H>  4.3   |  21  |  1.0    Ca    9.1      06 Oct 2020 00:09  Phos  4.5     10-  Mg     1.9     10-06        PT/INR - ( 05 Oct 2020 00:10 )   PT: 12.00 sec;   INR: 1.04 ratio

## 2021-03-19 ENCOUNTER — TELEPHONE (OUTPATIENT)
Dept: OBGYN CLINIC | Facility: CLINIC | Age: 31
End: 2021-03-19

## 2021-03-19 NOTE — TELEPHONE ENCOUNTER
Spoke with mariela tenorio with [facility] 03/23   Last cycle ended over 1 week ago  Will wait till apt for refil and instructions as to when to start

## 2021-03-19 NOTE — TELEPHONE ENCOUNTER
Patient called and would like a refill on her Birth Control  She ran out last week   Pharmacy and call back number still valid

## 2021-03-22 ENCOUNTER — TELEPHONE (OUTPATIENT)
Dept: OBGYN CLINIC | Facility: CLINIC | Age: 31
End: 2021-03-22

## 2021-03-22 DIAGNOSIS — Z01.419 ENCNTR FOR GYN EXAM (GENERAL) (ROUTINE) W/O ABN FINDINGS: ICD-10-CM

## 2021-03-22 NOTE — TELEPHONE ENCOUNTER
Luz Mackenzie had to som pts appt from 3/23 til 4/7 with Sandy Silverman,  pls refill BC until then,  Thanks

## 2021-03-23 ENCOUNTER — TELEPHONE (OUTPATIENT)
Dept: OBGYN CLINIC | Facility: CLINIC | Age: 31
End: 2021-03-23

## 2021-03-23 RX ORDER — NORETHINDRONE AND ETHINYL ESTRADIOL AND FERROUS FUMARATE 0.8-25(24)
1 KIT ORAL DAILY
Qty: 28 TABLET | Refills: 0 | Status: CANCELLED | OUTPATIENT
Start: 2021-03-23

## 2021-04-02 RX ORDER — NORETHINDRONE AND ETHINYL ESTRADIOL AND FERROUS FUMARATE 0.8-25(24)
1 KIT ORAL DAILY
Qty: 90 TABLET | Refills: 0 | Status: SHIPPED | OUTPATIENT
Start: 2021-04-02 | End: 2021-05-18 | Stop reason: SDUPTHER

## 2021-04-02 NOTE — TELEPHONE ENCOUNTER
Please no ensure history of deep vein thrombosis, pulmonary embolism, stroke, hypertension, breast cancer, or migraines with aura  Review slightly increased risk of the above with estrogen-containing pills  If any history of the above, do not fill Rx, schedule for outpatient appointment to discuss alternate forms  If no history of the above, okay to fill

## 2021-04-02 NOTE — TELEPHONE ENCOUNTER
Pt rescheduled her yearly with Ramona Hammans on 5/18 because she is still out of the office next week  She is out of her birth control and would like a refill sent to her pharmacy

## 2021-05-11 ENCOUNTER — HOSPITAL ENCOUNTER (EMERGENCY)
Facility: HOSPITAL | Age: 31
Discharge: HOME | End: 2021-05-11
Attending: EMERGENCY MEDICINE | Admitting: EMERGENCY MEDICINE
Payer: COMMERCIAL

## 2021-05-11 VITALS
SYSTOLIC BLOOD PRESSURE: 138 MMHG | HEIGHT: 65 IN | HEART RATE: 103 BPM | WEIGHT: 118 LBS | TEMPERATURE: 98.2 F | DIASTOLIC BLOOD PRESSURE: 79 MMHG | OXYGEN SATURATION: 99 % | BODY MASS INDEX: 19.66 KG/M2 | RESPIRATION RATE: 18 BRPM

## 2021-05-11 DIAGNOSIS — K04.7 DENTAL ABSCESS: ICD-10-CM

## 2021-05-11 DIAGNOSIS — K08.89 PAIN, DENTAL: Primary | ICD-10-CM

## 2021-05-11 PROCEDURE — 99282 EMERGENCY DEPT VISIT SF MDM: CPT

## 2021-05-11 PROCEDURE — 99284 EMERGENCY DEPT VISIT MOD MDM: CPT | Performed by: EMERGENCY MEDICINE

## 2021-05-11 RX ORDER — CLINDAMYCIN HYDROCHLORIDE 150 MG/1
300 CAPSULE ORAL ONCE
Status: COMPLETED | OUTPATIENT
Start: 2021-05-11 | End: 2021-05-11

## 2021-05-11 RX ORDER — CLINDAMYCIN HYDROCHLORIDE 300 MG/1
300 CAPSULE ORAL 4 TIMES DAILY
Qty: 28 CAPSULE | Refills: 0 | Status: SHIPPED | OUTPATIENT
Start: 2021-05-11 | End: 2021-05-18

## 2021-05-11 RX ADMIN — CLINDAMYCIN HYDROCHLORIDE 300 MG: 150 CAPSULE ORAL at 05:24

## 2021-05-11 NOTE — ED PROVIDER NOTES
History  Chief Complaint   Patient presents with    Dental Pain     left lower mouth pain       30 yo Female  Here for dental pain and left lower submandibular swelling  This has been going on for several days    No fever/chills  No n/v        Dental Pain  Location:  Lower  Lower teeth location:  17/LL 3rd molar  Quality:  Aching  Severity:  Moderate  Onset quality:  Gradual  Relieved by:  NSAIDs  Associated symptoms: facial swelling    Associated symptoms: no congestion, no difficulty swallowing, no drooling, no facial pain, no fever, no headaches, no neck pain, no neck swelling, no oral bleeding, no oral lesions and no trismus        Prior to Admission Medications   Prescriptions Last Dose Informant Patient Reported? Taking?    Norethin-Eth Estradiol-Fe 0 8-25 MG-MCG CHEW   No No   Sig: Chew 1 tablet daily   Probiotic Product (PROBIOTIC DAILY PO)  Self Yes No   Sig: Take 1 capsule by mouth daily   loratadine (CLARITIN) 10 mg tablet  Self Yes No   Sig: Take 10 mg by mouth daily      Facility-Administered Medications: None       Past Medical History:   Diagnosis Date    Abnormal Pap smear of cervix     Celiac disease     Celiac disease     Dental abscess     HPV (human papilloma virus) infection     Spontaneous      Spontaneous pneumothorax     right pneumothorax, treated with right VATS, bleb resection, apical pleurectomy 2012       Past Surgical History:   Procedure Laterality Date    CERVICAL BIOPSY N/A 4/3/2019    Procedure: BIOPSY CONE/COLD KNIFE CERVIX;  Surgeon: Chayo Pratt MD;  Location: [facility];  Service: Gynecology    CERVICAL BIOPSY  W/ LOOP ELECTRODE EXCISION  2018    COLPOSCOPY      ESOPHAGOGASTRODUODENOSCOPY  2015    THORACOSCOPY      (Therapeutic) with pleurodesis       Family History   Problem Relation Age of Onset    Hypothyroidism Father     Diabetes Brother     Liver cancer Maternal Grandfather     Hypertension Maternal Grandfather      I have reviewed and agree with the history as documented  E-Cigarette/Vaping    E-Cigarette Use Never User      E-Cigarette/Vaping Substances     Social History     Tobacco Use    Smoking status: Current Every Day Smoker     Packs/day: 0 50    Smokeless tobacco: Never Used   Substance Use Topics    Alcohol use: Yes     Frequency: Never     Drinks per session: 1 or 2     Binge frequency: Never    Drug use: Not Currently       Review of Systems   Constitutional: Negative for chills, diaphoresis, fatigue and fever  HENT: Positive for dental problem and facial swelling  Negative for congestion, drooling, mouth sores, rhinorrhea, sinus pressure, sore throat, trouble swallowing and voice change  Respiratory: Negative for cough, shortness of breath, wheezing and stridor  Cardiovascular: Negative for chest pain, palpitations and leg swelling  Gastrointestinal: Negative for abdominal pain, blood in stool, nausea and vomiting  Genitourinary: Negative for difficulty urinating, dysuria, flank pain and frequency  Musculoskeletal: Negative for arthralgias, back pain, gait problem, joint swelling, myalgias, neck pain and neck stiffness  Skin: Negative for rash and wound  Neurological: Negative for dizziness, light-headedness and headaches  All other systems reviewed and are negative  Physical Exam  Physical Exam  Constitutional:       General: She is not in acute distress  Appearance: She is well-developed  She is not ill-appearing, toxic-appearing or diaphoretic  HENT:      Head: Normocephalic and atraumatic  Ears:      Comments: Submandibular LA, left submandibular region      Nose: Nose normal       Mouth/Throat:      Mouth: Mucous membranes are moist       Pharynx: No oropharyngeal exudate  Comments: ttp over the left lower molar   Eyes:      General: No scleral icterus  Right eye: No discharge  Left eye: No discharge        Conjunctiva/sclera: Conjunctivae normal  Pupils: Pupils are equal, round, and reactive to light  Neck:      Musculoskeletal: Normal range of motion and neck supple  No neck rigidity or muscular tenderness  Vascular: No JVD  Trachea: No tracheal deviation  Cardiovascular:      Rate and Rhythm: Normal rate and regular rhythm  Heart sounds: Normal heart sounds  No murmur  No friction rub  No gallop  Pulmonary:      Effort: Pulmonary effort is normal  No respiratory distress  Breath sounds: No stridor  Abdominal:      Palpations: Abdomen is soft  Musculoskeletal: Normal range of motion  General: No swelling, tenderness, deformity or signs of injury  Right lower leg: No edema  Left lower leg: No edema  Lymphadenopathy:      Cervical: No cervical adenopathy  Skin:     General: Skin is warm  Capillary Refill: Capillary refill takes less than 2 seconds  Coloration: Skin is not jaundiced or pale  Findings: No bruising, erythema, lesion or rash  Neurological:      General: No focal deficit present  Mental Status: She is alert and oriented to person, place, and time  Mental status is at baseline  Cranial Nerves: No cranial nerve deficit  Sensory: No sensory deficit  Motor: No weakness or abnormal muscle tone  Coordination: Coordination normal    Psychiatric:         Behavior: Behavior normal          Thought Content:  Thought content normal          Judgment: Judgment normal          Vital Signs  ED Triage Vitals [05/11/21 0506]   Temperature Pulse Respirations Blood Pressure SpO2   98 2 °F (36 8 °C) 103 18 138/79 99 %      Temp Source Heart Rate Source Patient Position - Orthostatic VS BP Location FiO2 (%)   Temporal Monitor Sitting Left arm --      Pain Score       4           Vitals:    05/11/21 0506   BP: 138/79   Pulse: 103   Patient Position - Orthostatic VS: Sitting         Visual Acuity      ED Medications  Medications - No data to display    Diagnostic Studies  Results Reviewed     None                 No orders to display              Procedures  Procedures         ED Course                                           MDM    Disposition  Final diagnoses:   None     ED Disposition     None      Follow-up Information    None         Patient's Medications   Discharge Prescriptions    No medications on file     No discharge procedures on file      PDMP Review     None          ED Provider  Electronically Signed by           Byron Kiran MD  05/11/21 5325

## 2021-05-18 ENCOUNTER — ANNUAL EXAM (OUTPATIENT)
Dept: OBGYN CLINIC | Facility: CLINIC | Age: 31
End: 2021-05-18
Payer: COMMERCIAL

## 2021-05-18 VITALS
WEIGHT: 116.6 LBS | HEIGHT: 66 IN | BODY MASS INDEX: 18.74 KG/M2 | DIASTOLIC BLOOD PRESSURE: 64 MMHG | SYSTOLIC BLOOD PRESSURE: 120 MMHG

## 2021-05-18 DIAGNOSIS — Z01.419 ENCNTR FOR GYN EXAM (GENERAL) (ROUTINE) W/O ABN FINDINGS: ICD-10-CM

## 2021-05-18 DIAGNOSIS — D06.9 SEVERE DYSPLASIA OF CERVIX (CIN III): Primary | ICD-10-CM

## 2021-05-18 PROCEDURE — 99395 PREV VISIT EST AGE 18-39: CPT | Performed by: PHYSICIAN ASSISTANT

## 2021-05-18 PROCEDURE — 87624 HPV HI-RISK TYP POOLED RSLT: CPT | Performed by: PHYSICIAN ASSISTANT

## 2021-05-18 PROCEDURE — G0145 SCR C/V CYTO,THINLAYER,RESCR: HCPCS | Performed by: PHYSICIAN ASSISTANT

## 2021-05-18 RX ORDER — NORETHINDRONE AND ETHINYL ESTRADIOL AND FERROUS FUMARATE 0.8-25(24)
1 KIT ORAL DAILY
Qty: 90 TABLET | Refills: 3 | Status: SHIPPED | OUTPATIENT
Start: 2021-05-18 | End: 2022-05-04 | Stop reason: SDUPTHER

## 2021-05-18 NOTE — PROGRESS NOTES
Epi Ceron  1990    CC:  Yearly exam    S:  31 y o  female here for yearly exam  Her cycles are regular, not heavy or crampy  Sexual activity: She is sexually active with her  without pain, bleeding or dryness  Contraception:  She uses Generess for contraception  STD testing:  She does not request STD testing today  Gardasil:  She has had the Gardasil series  We reviewed [facility] guidelines for Pap testing       Last Pap   Pap 12/10/15 neg  Pap 12/19/17 LGSIL r/o HGSIL  Colpo 1/31/18 JOSE 2-3  LEEP 3/5/18 JOSE 2, + margins  Pap 3/6/19 HGSIL, NILSA  Colpo 3/14/19 JOSE I  [facility] bx 4/3/19 benign  Pap 10/24/19 neg (no HPV run)  Pap 3/13/20 neg/neg    Family hx of breast cancer: no  Family hx of ovarian cancer: no  Family hx of colon cancer: no    Current Outpatient Medications:     clindamycin (CLEOCIN) 300 MG capsule, Take 1 capsule (300 mg total) by mouth 4 (four) times a day for 7 days, Disp: 28 capsule, Rfl: 0    loratadine (CLARITIN) 10 mg tablet, Take 10 mg by mouth daily, Disp: , Rfl:     Norethin-Eth Estradiol-Fe 0 8-25 MG-MCG CHEW, Chew 1 tablet daily, Disp: 90 tablet, Rfl: 0    Probiotic Product (PROBIOTIC DAILY PO), Take 1 capsule by mouth daily, Disp: , Rfl:   Social History     Socioeconomic History    Marital status: Single     Spouse name: Not on file    Number of children: Not on file    Years of education: Not on file    Highest education level: Not on file   Occupational History    Not on file   Social Needs    Financial resource strain: Not on file    Food insecurity     Worry: Not on file     Inability: Not on file   [employer] needs     Medical: Not on file     Non-medical: Not on file   Tobacco Use    Smoking status: Current Every Day Smoker     Packs/day: 0 50    Smokeless tobacco: Never Used   Substance and Sexual Activity    Alcohol use: Yes     Frequency: Never     Drinks per session: 1 or 2     Binge frequency: Never    Drug use: Not Currently    Sexual activity: Yes     Partners: Male     Birth control/protection: Pill     Comment: contraceptives / oral contraceptives   Lifestyle    Physical activity     Days per week: Not on file     Minutes per session: Not on file    Stress: Not on file   Relationships    Social connections     Talks on phone: Not on file     Gets together: Not on file     Attends Religion service: Not on file     Active member of club or organization: Not on file     Attends meetings of clubs or organizations: Not on file     Relationship status: Not on file    Intimate partner violence     Fear of current or ex partner: Not on file     Emotionally abused: Not on file     Physically abused: Not on file     Forced sexual activity: Not on file   Other Topics Concern    Not on file   Social History Narrative    Daily caffeinated coffee consumption    Exercises regularly     Family History   Problem Relation Age of Onset    Hypothyroidism Father     Diabetes Brother     Liver cancer Maternal Grandfather     Hypertension Maternal Grandfather      Past Medical History:   Diagnosis Date    Abnormal Pap smear of cervix     Celiac disease     Celiac disease     Dental abscess     HPV (human papilloma virus) anogenital infection     HPV (human papilloma virus) infection     Spontaneous      Spontaneous pneumothorax     right pneumothorax, treated with right VATS, bleb resection, apical pleurectomy 2012       Review of Systems   Respiratory: Negative  Cardiovascular: Negative  Gastrointestinal: Negative for constipation and diarrhea  Genitourinary: Negative for difficulty urinating, pelvic pain, vaginal bleeding, vaginal discharge, itching or odor  O:  Blood pressure 120/64, height 5' 5 75" (1 67 m), weight 52 9 kg (116 lb 9 6 oz), last menstrual period 2021      Patient appears well and is not in distress  Neck is supple without masses  Breasts are symmetrical without mass, tenderness, nipple discharge, skin changes or adenopathy  Abdomen is soft and nontender without masses  External genitals are normal without lesions or rashes  Urethra and urethral meatus are normal  Bladder is normal to palpation  Vagina is normal without discharge or bleeding  Cervix is normal without discharge or lesion  Uterus is normal, mobile, nontender without palpable mass  Adnexa are normal, nontender, without palpable mass  A:   Yearly exam     Hx JOSE 3     P:   Pap and HPV today, if neg/neg, repeat 3 years   Generess sent to pharmacy     RTO one year for yearly exam or sooner as needed

## 2021-05-21 LAB
LAB AP GYN PRIMARY INTERPRETATION: NORMAL
Lab: NORMAL

## 2022-05-04 ENCOUNTER — TELEPHONE (OUTPATIENT)
Dept: OBGYN CLINIC | Facility: CLINIC | Age: 32
End: 2022-05-04

## 2022-05-04 DIAGNOSIS — Z01.419 ENCNTR FOR GYN EXAM (GENERAL) (ROUTINE) W/O ABN FINDINGS: ICD-10-CM

## 2022-05-04 RX ORDER — NORETHINDRONE AND ETHINYL ESTRADIOL AND FERROUS FUMARATE 0.8-25(24)
1 KIT ORAL DAILY
Qty: 90 TABLET | Refills: 0 | Status: SHIPPED | OUTPATIENT
Start: 2022-05-04 | End: 2022-07-19 | Stop reason: SDUPTHER

## 2022-07-19 ENCOUNTER — ANNUAL EXAM (OUTPATIENT)
Dept: OBGYN CLINIC | Facility: CLINIC | Age: 32
End: 2022-07-19
Payer: COMMERCIAL

## 2022-07-19 VITALS
SYSTOLIC BLOOD PRESSURE: 120 MMHG | HEIGHT: 65 IN | BODY MASS INDEX: 18.26 KG/M2 | WEIGHT: 109.6 LBS | DIASTOLIC BLOOD PRESSURE: 64 MMHG

## 2022-07-19 DIAGNOSIS — Z01.419 ENCNTR FOR GYN EXAM (GENERAL) (ROUTINE) W/O ABN FINDINGS: Primary | ICD-10-CM

## 2022-07-19 PROCEDURE — 99395 PREV VISIT EST AGE 18-39: CPT | Performed by: PHYSICIAN ASSISTANT

## 2022-07-19 PROCEDURE — 0503F POSTPARTUM CARE VISIT: CPT | Performed by: PHYSICIAN ASSISTANT

## 2022-07-19 RX ORDER — NORETHINDRONE AND ETHINYL ESTRADIOL AND FERROUS FUMARATE 0.8-25(24)
1 KIT ORAL DAILY
Qty: 90 TABLET | Refills: 4 | Status: SHIPPED | OUTPATIENT
Start: 2022-07-19

## 2022-07-19 NOTE — PROGRESS NOTES
Diana Schneider  1990    CC:  Yearly exam    S:  36 y o  female here for yearly exam  Her cycles are regular, not heavy or crampy  Sexual activity: She is sexually active with her  without pain, bleeding or dryness  They have four cats that keep her busy  They are unsure if they want children in the future  Contraception:  She uses Generess for contraception  STD testing:  She does not request STD testing today  Gardasil:  She has had the Gardasil series  We reviewed [facility] guidelines for Pap testing  Pap 12/10/15 neg  Pap 12/19/17 LGSIL r/o HGSIL  Colpo 1/31/18 JOSE 2-3  LEEP 3/5/18 JOSE 2, + margins  Pap 3/6/19 HGSIL, NILSA  Colpo 3/14/19 JOSE I  Cone bx 4/3/19 benign  Pap 10/24/19 neg (no HPV run)  Pap 3/13/20 neg/neg  Pap 5/18/21 neg/neg    Family hx of breast cancer: no  Family hx of ovarian cancer: no  Family hx of colon cancer: no      Current Outpatient Medications:     loratadine (CLARITIN) 10 mg tablet, Take 10 mg by mouth daily, Disp: , Rfl:     Norethin-Eth Estradiol-Fe 0 8-25 MG-MCG CHEW, Chew 1 tablet daily, Disp: 90 tablet, Rfl: 0    Probiotic Product (PROBIOTIC DAILY PO), Take 1 capsule by mouth daily, Disp: , Rfl:   Social History     Socioeconomic History    Marital status: Single     Spouse name: Not on file    Number of children: Not on file    Years of education: Not on file    Highest education level: Not on file   Occupational History    Not on file   Tobacco Use    Smoking status: Current Every Day Smoker     Packs/day: 0 50    Smokeless tobacco: Never Used   Vaping Use    Vaping Use: Never used   Substance and Sexual Activity    Alcohol use:  Yes    Drug use: Not Currently    Sexual activity: Yes     Partners: Male     Birth control/protection: Pill     Comment: contraceptives / oral contraceptives   Other Topics Concern    Not on file   Social History Narrative    Daily caffeinated coffee consumption    Exercises regularly     Social Determinants of Health     Financial Resource Strain: Not on file   Food Insecurity: Not on file   Transportation Needs: Not on file   Physical Activity: Not on file   Stress: Not on file   Social Connections: Not on file   Intimate Partner Violence: Not on file   Housing Stability: Not on file     Family History   Problem Relation Age of Onset    Hypothyroidism Father     Diabetes Brother     Liver cancer Maternal Grandfather     Hypertension Maternal Grandfather      Past Medical History:   Diagnosis Date    Abnormal Pap smear of cervix     Celiac disease     Celiac disease     Dental abscess     HPV (human papilloma virus) anogenital infection     HPV (human papilloma virus) infection     Severe dysplasia of cervix (JOSE III)     Spontaneous      Spontaneous pneumothorax     right pneumothorax, treated with right VATS, bleb resection, apical pleurectomy 2012       Review of Systems   Respiratory: Negative  Cardiovascular: Negative  Gastrointestinal: Negative for constipation and diarrhea  Genitourinary: Negative for difficulty urinating, pelvic pain, vaginal bleeding, vaginal discharge, itching or odor  O:  Blood pressure 120/64, height 5' 5 35" (1 66 m), weight 49 7 kg (109 lb 9 6 oz), last menstrual period 2022  Patient appears well and is not in distress  Neck is supple without masses  Breasts are symmetrical without mass, tenderness, nipple discharge, skin changes or adenopathy  Abdomen is soft and nontender without masses  External genitals are normal without lesions or rashes  Urethra and urethral meatus are normal  Bladder is normal to palpation  Vagina is normal without discharge or bleeding  Cervix is normal without discharge or lesion  Uterus is normal, mobile, nontender without palpable mass  Adnexa are normal, nontender, without palpable mass       A:   Yearly exam     Hx JOSE 3 in     P:   Pap    Generess sent to pharmacy     RTO one year for yearly exam or sooner as needed

## 2023-07-24 ENCOUNTER — OFFICE VISIT (OUTPATIENT)
Dept: OBGYN CLINIC | Facility: CLINIC | Age: 33
End: 2023-07-24
Payer: COMMERCIAL

## 2023-07-24 VITALS
WEIGHT: 116.4 LBS | SYSTOLIC BLOOD PRESSURE: 130 MMHG | HEIGHT: 65 IN | HEART RATE: 88 BPM | BODY MASS INDEX: 19.39 KG/M2 | DIASTOLIC BLOOD PRESSURE: 66 MMHG

## 2023-07-24 DIAGNOSIS — D06.9 SEVERE DYSPLASIA OF CERVIX (CIN III): ICD-10-CM

## 2023-07-24 DIAGNOSIS — Z01.419 ENCNTR FOR GYN EXAM (GENERAL) (ROUTINE) W/O ABN FINDINGS: Primary | ICD-10-CM

## 2023-07-24 DIAGNOSIS — Z12.4 SCREENING FOR CERVICAL CANCER: ICD-10-CM

## 2023-07-24 PROCEDURE — G0124 SCREEN C/V THIN LAYER BY MD: HCPCS | Performed by: PATHOLOGY

## 2023-07-24 PROCEDURE — G0145 SCR C/V CYTO,THINLAYER,RESCR: HCPCS | Performed by: PATHOLOGY

## 2023-07-24 PROCEDURE — 99395 PREV VISIT EST AGE 18-39: CPT | Performed by: OBSTETRICS & GYNECOLOGY

## 2023-07-24 PROCEDURE — G0476 HPV COMBO ASSAY CA SCREEN: HCPCS | Performed by: OBSTETRICS & GYNECOLOGY

## 2023-07-24 RX ORDER — NORETHINDRONE AND ETHINYL ESTRADIOL AND FERROUS FUMARATE 0.8-25(24)
1 KIT ORAL DAILY
Qty: 90 TABLET | Refills: 4 | Status: SHIPPED | OUTPATIENT
Start: 2023-07-24

## 2023-07-24 NOTE — ASSESSMENT & PLAN NOTE
- Discussed ACOG guidelines for pap smear screening frequency: performed today  - Discussed healthy lifestyle recommendations for diet, exercise and self breast awareness.  - Discussed ACOG recommendations for screening mammograms: not indicated today. - Discussed age based recommendations for adequate calcium and vitamin D intake. No additional osteoporosis screening indicated at this time. - Discussed ACOG recommendations for colon cancer screening: not indicated at this time. - Safe sex practices were discussed and STI testing was /not desired by the patient  - Contraceptive options were reviewed: OCP refilled. Reviewed would not be able to continue estrogen containing pills if 31 y/o and still smoking   - Routine follow up in 1 year was recommended or sooner as needed. All questions and concerns were addressed.

## 2023-07-24 NOTE — PROGRESS NOTES
Tierra Pimentel is a 29 y.o. female who presents for annual exam.      Chief Complaint   Patient presents with   • Gynecologic Exam     Previously at [facility],     OCP - happy with method, no issues taking daily, forgets a pill once every 1-2 months  withdrawal bleed light, occasional BRB but not frequently         Last Pap: 2021  Last mammogram: Not on file  Colorectal cancer screening: Not on file  DEXA: n/a     HPV vaccine completed:yes - per pt rcollection   Current contraception: OCP (estrogen/progesterone)  History of abnormal Pap smear: yes - h/o LEEP and cone  Pap 21 neg/neg  History of abnormal mammogram: no      Family history of uterine or ovarian cancer: no  Family history of breast cancer: MGM,  8 years ago, late 52's   Family history of colon cancer: no      Menstrual History:  OB History        1    Para        Term                AB   1    Living   0       SAB   1    IAB        Ectopic        Multiple        Live Births                        Patient's last menstrual period was 2023.          Past Medical History:   Diagnosis Date   • Abnormal Pap smear of cervix    • Celiac disease    • Celiac disease    • Dental abscess    • HPV (human papilloma virus) anogenital infection    • HPV (human papilloma virus) infection    • Severe dysplasia of cervix (JOSE III)    • Spontaneous     • Spontaneous pneumothorax     right pneumothorax, treated with right VATS, bleb resection, apical pleurectomy 2012     Past Surgical History:   Procedure Laterality Date   • CERVICAL BIOPSY N/A 4/3/2019    Procedure: BIOPSY CONE/COLD KNIFE CERVIX;  Surgeon: Mayco Alvarenga MD;  Location: [facility];  Service: Gynecology   • CERVICAL BIOPSY  W/ LOOP ELECTRODE EXCISION     • COLPOSCOPY     • ESOPHAGOGASTRODUODENOSCOPY  2015   • THORACOSCOPY      (Therapeutic) with pleurodesis     Family History   Problem Relation Age of Onset   • Hypothyroidism Father    • Diabetes Brother    • Breast cancer Maternal Grandmother    • Liver cancer Maternal Grandfather    • Hypertension Maternal Grandfather    • Ovarian cancer Neg Hx    • Colon cancer Neg Hx        Social History     Tobacco Use   • Smoking status: Every Day     Packs/day: 0.50     Types: Cigarettes   • Smokeless tobacco: Never   Vaping Use   • Vaping Use: Never used   Substance Use Topics   • Alcohol use: Yes   • Drug use: Not Currently          Current Outpatient Medications:   •  loratadine (CLARITIN) 10 mg tablet, Take 10 mg by mouth daily, Disp: , Rfl:   •  Norethin-Eth Estradiol-Fe 0.8-25 MG-MCG CHEW, Chew 1 tablet daily, Disp: 90 tablet, Rfl: 4  •  Probiotic Product (PROBIOTIC DAILY PO), Take 1 capsule by mouth daily, Disp: , Rfl:     Allergies   Allergen Reactions   • Gluten Meal - Food Allergy            Review of Systems   Constitutional: Negative for appetite change, chills and fever. Eyes: Negative for visual disturbance. Respiratory: Negative for cough, chest tightness and shortness of breath. Cardiovascular: Negative for chest pain. Gastrointestinal: Negative for abdominal distention, abdominal pain, constipation, diarrhea, nausea and vomiting. Endocrine: Negative for cold intolerance and heat intolerance. Genitourinary: Negative for difficulty urinating, dyspareunia, dysuria, frequency, genital sores, pelvic pain, urgency, vaginal bleeding, vaginal discharge and vaginal pain. Musculoskeletal: Negative for arthralgias. Neurological: Negative for light-headedness and headaches. Hematological: Does not bruise/bleed easily. Psychiatric/Behavioral: Negative for behavioral problems. All other systems reviewed and are negative. /66   Pulse 88   Ht 5' 5" (1.651 m)   Wt 52.8 kg (116 lb 6.4 oz)   LMP 07/17/2023   BMI 19.37 kg/m²         Physical Exam  Constitutional:       General: She is not in acute distress. Appearance: Normal appearance. Genitourinary:      Vulva, bladder and urethral meatus normal.      No lesions in the vagina. Right Labia: No rash, tenderness, lesions or skin changes. Left Labia: No tenderness, lesions, skin changes or rash. No labial fusion noted. No inguinal adenopathy present in the right or left side. No vaginal discharge, erythema, tenderness, bleeding or ulceration. No vaginal prolapse present. Right Adnexa: not tender, not full and no mass present. Left Adnexa: not tender, not full and no mass present. No cervical motion tenderness, discharge, friability, lesion or polyp. Cervical exam comments: Somewhat attenuated appearing due to prior excisional procedures . Uterus is not enlarged, fixed, tender or irregular. No uterine mass detected. Uterus is anteverted. Pelvic exam was performed with patient in the lithotomy position. Breasts:     Right: No swelling, bleeding, inverted nipple, mass, nipple discharge, skin change or tenderness. Left: No swelling, bleeding, inverted nipple, mass, nipple discharge, skin change or tenderness. HENT:      Head: Normocephalic and atraumatic. Neck:      Thyroid: No thyromegaly. Cardiovascular:      Rate and Rhythm: Normal rate and regular rhythm. Pulmonary:      Effort: Pulmonary effort is normal. No accessory muscle usage or respiratory distress. Abdominal:      General: There is no distension. Palpations: Abdomen is soft. Tenderness: There is no abdominal tenderness. There is no guarding or rebound. Musculoskeletal:         General: Normal range of motion. Cervical back: Normal range of motion and neck supple. Lymphadenopathy:      Upper Body:      Right upper body: No supraclavicular or axillary adenopathy. Left upper body: No supraclavicular or axillary adenopathy. Lower Body: No right inguinal and no right inguinal adenopathy. No left inguinal and no left inguinal adenopathy. Neurological:      General: No focal deficit present. Mental Status: She is alert. Skin:     General: Skin is warm and dry. Findings: No erythema. Psychiatric:         Mood and Affect: Mood normal.         Behavior: Behavior normal.   Vitals and nursing note reviewed. Exam conducted with a chaperone present. Severe dysplasia of cervix (JOSE III)  Pap with HPV collected today, if normal can continue every 3 years     Encntr for gyn exam (general) (routine) w/o abn findings  - Discussed ACOG guidelines for pap smear screening frequency: performed today  - Discussed healthy lifestyle recommendations for diet, exercise and self breast awareness.  - Discussed ACOG recommendations for screening mammograms: not indicated today. - Discussed age based recommendations for adequate calcium and vitamin D intake. No additional osteoporosis screening indicated at this time. - Discussed ACOG recommendations for colon cancer screening: not indicated at this time. - Safe sex practices were discussed and STI testing was /not desired by the patient  - Contraceptive options were reviewed: OCP refilled. Reviewed would not be able to continue estrogen containing pills if 31 y/o and still smoking   - Routine follow up in 1 year was recommended or sooner as needed. All questions and concerns were addressed.

## 2023-08-01 LAB
LAB AP GYN PRIMARY INTERPRETATION: ABNORMAL
Lab: ABNORMAL
PATH INTERP SPEC-IMP: ABNORMAL

## 2023-08-04 ENCOUNTER — PROCEDURE VISIT (OUTPATIENT)
Dept: OBGYN CLINIC | Facility: CLINIC | Age: 33
End: 2023-08-04
Payer: COMMERCIAL

## 2023-08-04 VITALS
OXYGEN SATURATION: 98 % | SYSTOLIC BLOOD PRESSURE: 124 MMHG | BODY MASS INDEX: 18.93 KG/M2 | DIASTOLIC BLOOD PRESSURE: 66 MMHG | WEIGHT: 113.6 LBS | HEART RATE: 105 BPM | HEIGHT: 65 IN | TEMPERATURE: 99.5 F

## 2023-08-04 DIAGNOSIS — D06.9 SEVERE DYSPLASIA OF CERVIX (CIN III): Primary | ICD-10-CM

## 2023-08-04 DIAGNOSIS — Z01.812 PRE-PROCEDURE LAB EXAM: ICD-10-CM

## 2023-08-04 LAB — SL AMB POCT URINE HCG: NEGATIVE

## 2023-08-04 PROCEDURE — 81025 URINE PREGNANCY TEST: CPT | Performed by: OBSTETRICS & GYNECOLOGY

## 2023-08-04 PROCEDURE — 57455 BIOPSY OF CERVIX W/SCOPE: CPT | Performed by: OBSTETRICS & GYNECOLOGY

## 2023-08-04 PROCEDURE — 88344 IMHCHEM/IMCYTCHM EA MLT ANTB: CPT | Performed by: PATHOLOGY

## 2023-08-04 PROCEDURE — 88305 TISSUE EXAM BY PATHOLOGIST: CPT | Performed by: PATHOLOGY

## 2023-08-04 NOTE — PROGRESS NOTES
Colposcopy     Date/Time 8/4/2023 11:15 AM     Universal Protocol   Consent: Verbal consent obtained. Written consent obtained. Risks and benefits: risks, benefits and alternatives were discussed  Consent given by: patient  Patient understanding: patient states understanding of the procedure being performed  Patient consent: the patient's understanding of the procedure matches consent given  Procedure consent: procedure consent matches procedure scheduled  Relevant documents: relevant documents present and verified  Test results: test results available and properly labeled  Required items: required blood products, implants, devices, and special equipment available       Performed by  Ramila Peralta MD   Authorized by Ramila Peralta MD       Pre-procedure details     Prepped with: acetic acid       Indication    ASC-US     Procedure Details   Procedure: Colposcopy w/ biopsy of cervix      Milmay speculum was placed in the vagina: yes      Under colposcopic examination the transition zone was seen in entirety: yes      Cervical biopsy performed with a cervical biopsy punch: yes      Monsel's solution was applied: yes      Biopsy(s): yes      Location:  2 o clock    Specimen to pathology: yes       Post-procedure      Findings: White epithelium           Results for orders placed or performed in visit on 08/04/23   POCT urine HCG   Result Value Ref Range    URINE HCG Negative      Severe dysplasia of cervix (JOSE III)  Mild ACW change noted. Impression low grade/benign. F/u based on results.  Precautions reviewed

## 2023-08-10 PROCEDURE — 88344 IMHCHEM/IMCYTCHM EA MLT ANTB: CPT | Performed by: PATHOLOGY

## 2023-08-10 PROCEDURE — 88305 TISSUE EXAM BY PATHOLOGIST: CPT | Performed by: PATHOLOGY

## 2024-02-21 PROBLEM — Z01.419 ENCNTR FOR GYN EXAM (GENERAL) (ROUTINE) W/O ABN FINDINGS: Status: RESOLVED | Noted: 2023-07-24 | Resolved: 2024-02-21

## 2024-06-07 ENCOUNTER — OFFICE VISIT (OUTPATIENT)
Dept: FAMILY MEDICINE CLINIC | Facility: CLINIC | Age: 34
End: 2024-06-07
Payer: COMMERCIAL

## 2024-06-07 VITALS
HEIGHT: 65 IN | TEMPERATURE: 98.7 F | SYSTOLIC BLOOD PRESSURE: 125 MMHG | BODY MASS INDEX: 19.76 KG/M2 | OXYGEN SATURATION: 96 % | DIASTOLIC BLOOD PRESSURE: 68 MMHG | HEART RATE: 93 BPM | WEIGHT: 118.6 LBS

## 2024-06-07 DIAGNOSIS — G44.86 CERVICOGENIC HEADACHE: Primary | ICD-10-CM

## 2024-06-07 DIAGNOSIS — R13.12 OROPHARYNGEAL DYSPHAGIA: ICD-10-CM

## 2024-06-07 PROBLEM — L42 PITYRIASIS ROSEA: Status: RESOLVED | Noted: 2019-08-26 | Resolved: 2024-06-07

## 2024-06-07 PROCEDURE — 99203 OFFICE O/P NEW LOW 30 MIN: CPT | Performed by: FAMILY MEDICINE

## 2024-06-07 RX ORDER — IBUPROFEN 200 MG
400 TABLET ORAL EVERY 6 HOURS PRN
Qty: 40 TABLET | Refills: 0 | Status: SHIPPED | OUTPATIENT
Start: 2024-06-07

## 2024-06-07 NOTE — ASSESSMENT & PLAN NOTE
Patient with signs and symptoms consistent with oropharyngeal dysphagia which may or may not be related to GERD.  Patient has not required medication management for this due to lifestyle changes and reflux diet.    Referral to gastroenterology is provided for further evaluation and management.

## 2024-06-07 NOTE — ASSESSMENT & PLAN NOTE
Prescription is provided for ibuprofen to be taken as needed for headaches.  Note to pharmacy that formulation must be gluten-free.  Can consider alternate NSAID management as needed.    Follow-up in 3 months for annual physical or sooner as needed.

## 2024-06-07 NOTE — PROGRESS NOTES
Ambulatory Visit  Name: Claudia Smalls      : 1990      MRN: [id number]  Encounter Provider: Kinjal Faith DO  Encounter Date: 2024   Encounter department: [facility]    Assessment & Plan   1. Cervicogenic headache  Assessment & Plan:  Prescription is provided for ibuprofen to be taken as needed for headaches.  Note to pharmacy that formulation must be gluten-free.  Can consider alternate NSAID management as needed.    Follow-up in 3 months for annual physical or sooner as needed.  Orders:  -     ibuprofen (MOTRIN) 200 mg tablet; Take 2 tablets (400 mg total) by mouth every 6 (six) hours as needed for mild pain  2. Oropharyngeal dysphagia  Assessment & Plan:  Patient with signs and symptoms consistent with oropharyngeal dysphagia which may or may not be related to GERD.  Patient has not required medication management for this due to lifestyle changes and reflux diet.    Referral to gastroenterology is provided for further evaluation and management.  Orders:  -     Ambulatory Referral to Gastroenterology; Future      Depression Screening and Follow-up Plan: Patient was screened for depression during today's encounter. They screened negative with a PHQ-2 score of 0.    Tobacco Cessation Counseling: Tobacco cessation counseling was provided. The patient is sincerely urged to quit consumption of tobacco. She is not ready to quit tobacco.       History of Present Illness     Claudia is a 33-year-old female with past medical history of celiac disease, GERD, JOSE-3 who presents today for appointment to establish care.  Patient notes that she has recently moved from [facility] to the [facility] and so needed a new primary care office.    Patient notes concern regarding headaches which occur every 1 to 2 months.  Notes that they start with neck tension and then radiate up her head and progressed to a migraine.  Notes that these have been successfully managed with ibuprofen in the past however  she has had difficulty obtaining a gluten free formulation for this.    Patient also notes difficulty with oropharyngeal swallowing.  She notes that she frequently has to slow down eating or decrease the size of meals to get through them.  She does not feel discomfort in her chest with this.  She denies difficulty with liquids.  She does note that she is not able to eat certain foods and notes that she feels dryness underneath her tongue.        Review of Systems   Constitutional:  Negative for fatigue and fever.   HENT:  Positive for trouble swallowing. Negative for congestion and sore throat.    Respiratory:  Negative for cough and shortness of breath.    Cardiovascular:  Negative for chest pain and palpitations.   Gastrointestinal:  Negative for abdominal pain, blood in stool, constipation, diarrhea, nausea and vomiting.   Genitourinary:  Negative for dysuria and hematuria.   Musculoskeletal:  Negative for arthralgias and myalgias.   Skin:  Negative for rash.   Neurological:  Positive for headaches. Negative for dizziness.   Psychiatric/Behavioral:  Negative for dysphoric mood. The patient is not nervous/anxious.      Medical History Reviewed by provider this encounter:  Tobacco  Allergies  Meds  Problems  Med Hx  Surg Hx  Fam Hx       Past Medical History   Past Medical History:   Diagnosis Date   • Abnormal Pap smear of cervix    • Celiac disease    • Celiac disease    • Dental abscess    • HPV (human papilloma virus) anogenital infection    • HPV (human papilloma virus) infection    • Severe dysplasia of cervix (JOSE III)    • Spontaneous     • Spontaneous pneumothorax     right pneumothorax, treated with right VATS, bleb resection, apical pleurectomy 2012     Past Surgical History:   Procedure Laterality Date   • CERVICAL BIOPSY N/A 4/3/2019    Procedure: BIOPSY CONE/COLD KNIFE CERVIX;  Surgeon: Dotty Ibrahim MD;  Location: [facility];  Service: Gynecology   • CERVICAL BIOPSY  W/  "LOOP ELECTRODE EXCISION  2018   • COLPOSCOPY     • ESOPHAGOGASTRODUODENOSCOPY  12/29/2015   • THORACOSCOPY      (Therapeutic) with pleurodesis     Family History   Problem Relation Age of Onset   • Hypertension Mother    • Hypothyroidism Father    • Diabetes Brother    • Breast cancer Maternal Grandmother    • Liver cancer Maternal Grandfather    • Hypertension Maternal Grandfather    • Ovarian cancer Neg Hx    • Colon cancer Neg Hx      Current Outpatient Medications on File Prior to Visit   Medication Sig Dispense Refill   • loratadine (CLARITIN) 10 mg tablet Take 10 mg by mouth daily     • Norethin-Eth Estradiol-Fe 0.8-25 MG-MCG CHEW Chew 1 tablet daily 90 tablet 4   • Probiotic Product (PROBIOTIC DAILY PO) Take 1 capsule by mouth daily       No current facility-administered medications on file prior to visit.     Allergies   Allergen Reactions   • Gluten Meal - Food Allergy       Current Outpatient Medications on File Prior to Visit   Medication Sig Dispense Refill   • loratadine (CLARITIN) 10 mg tablet Take 10 mg by mouth daily     • Norethin-Eth Estradiol-Fe 0.8-25 MG-MCG CHEW Chew 1 tablet daily 90 tablet 4   • Probiotic Product (PROBIOTIC DAILY PO) Take 1 capsule by mouth daily       No current facility-administered medications on file prior to visit.      Social History     Tobacco Use   • Smoking status: Every Day     Current packs/day: 0.50     Types: Cigarettes   • Smokeless tobacco: Never   Vaping Use   • Vaping status: Never Used   Substance and Sexual Activity   • Alcohol use: Not Currently   • Drug use: Not Currently   • Sexual activity: Yes     Partners: Male     Birth control/protection: Pill     Comment: contraceptives / oral contraceptives     Objective     /68 (BP Location: Left arm, Patient Position: Sitting, Cuff Size: Standard)   Pulse 93   Temp 98.7 °F (37.1 °C)   Ht 5' 5\" (1.651 m)   Wt 53.8 kg (118 lb 9.6 oz)   SpO2 96%   BMI 19.74 kg/m²     Physical Exam  Vitals reviewed. "   Constitutional:       General: She is not in acute distress.     Appearance: She is well-developed.   HENT:      Head: Normocephalic and atraumatic.      Right Ear: Tympanic membrane, ear canal and external ear normal.      Left Ear: Tympanic membrane, ear canal and external ear normal.      Nose: Nose normal.      Mouth/Throat:      Mouth: Mucous membranes are moist.      Pharynx: Oropharynx is clear.   Eyes:      Conjunctiva/sclera: Conjunctivae normal.      Pupils: Pupils are equal, round, and reactive to light.   Cardiovascular:      Rate and Rhythm: Normal rate and regular rhythm.      Heart sounds: No murmur heard.  Pulmonary:      Effort: Pulmonary effort is normal. No respiratory distress.      Breath sounds: Normal breath sounds.   Abdominal:      Palpations: Abdomen is soft.      Tenderness: There is no abdominal tenderness.   Musculoskeletal:      Cervical back: Neck supple.      Right lower leg: No edema.      Left lower leg: No edema.   Lymphadenopathy:      Cervical: No cervical adenopathy.   Skin:     General: Skin is warm and dry.   Neurological:      General: No focal deficit present.      Mental Status: She is alert and oriented to person, place, and time.   Psychiatric:         Mood and Affect: Mood normal.         Behavior: Behavior normal.       Administrative Statements

## 2024-08-26 ENCOUNTER — ANNUAL EXAM (OUTPATIENT)
Age: 34
End: 2024-08-26
Payer: COMMERCIAL

## 2024-08-26 VITALS
SYSTOLIC BLOOD PRESSURE: 120 MMHG | BODY MASS INDEX: 20.09 KG/M2 | DIASTOLIC BLOOD PRESSURE: 72 MMHG | WEIGHT: 120.6 LBS | HEART RATE: 134 BPM | HEIGHT: 65 IN

## 2024-08-26 DIAGNOSIS — Z12.4 SCREENING FOR CERVICAL CANCER: ICD-10-CM

## 2024-08-26 DIAGNOSIS — Z01.419 ENCNTR FOR GYN EXAM (GENERAL) (ROUTINE) W/O ABN FINDINGS: ICD-10-CM

## 2024-08-26 DIAGNOSIS — Z31.69 ENCOUNTER FOR PRECONCEPTION CONSULTATION: ICD-10-CM

## 2024-08-26 DIAGNOSIS — Z01.419 ENCOUNTER FOR ANNUAL ROUTINE GYNECOLOGICAL EXAMINATION: Primary | ICD-10-CM

## 2024-08-26 PROCEDURE — G0476 HPV COMBO ASSAY CA SCREEN: HCPCS | Performed by: OBSTETRICS & GYNECOLOGY

## 2024-08-26 PROCEDURE — S0612 ANNUAL GYNECOLOGICAL EXAMINA: HCPCS | Performed by: OBSTETRICS & GYNECOLOGY

## 2024-08-26 PROCEDURE — G0145 SCR C/V CYTO,THINLAYER,RESCR: HCPCS | Performed by: PATHOLOGY

## 2024-08-26 PROCEDURE — G0124 SCREEN C/V THIN LAYER BY MD: HCPCS | Performed by: PATHOLOGY

## 2024-08-26 RX ORDER — NORETHINDRONE AND ETHINYL ESTRADIOL AND FERROUS FUMARATE 0.8-25(24)
1 KIT ORAL DAILY
Qty: 90 TABLET | Refills: 4 | Status: SHIPPED | OUTPATIENT
Start: 2024-08-26

## 2024-08-26 NOTE — ASSESSMENT & PLAN NOTE
Preconception counseling was performed for patient.  She was prescribed prenatal vitamins to start.  Discussed with patient maintaining a healthy lifestyle with regular exercise and that maintaining a healthy weight is associated with more optimal pregnancy outcomes.   She was advised to avoid smoking/secondhand smoke exposure, excessive alcohol consumption, and illicit drug use as these can both impair fertility and be harmful to an early pregnancy.  She was advised to limit caffeine intake to approximately 200 mg per day.  Discussed taking a prenatal vitamin daily with at least 400 mcg of folic acid.  Also discussed with patient optimization of natural fertility including timed intercourse and coital frequency.  Discussed potential implications of prior LEEP and CKC, universal cervical length screening at time of level 2 US

## 2024-08-26 NOTE — PROGRESS NOTES
Subjective      Claudia Smalls is a 33 y.o. female who presents for annual exam.      Chief Complaint   Patient presents with    Gynecologic Exam     Yearly exam     On OCP - happy with it, denies frequent BRB   May be considering pregnancy in the next year or so - she intends on working on smoking cessation prior to conceiving       Last Pap: 2023 ASCUS/HPV neg. Colpo bx with CIN1       HPV vaccine completed:yes   Current contraception: OCP (estrogen/progesterone)  History of abnormal Pap smear: yes - h/o LEEP and CKC   History of abnormal mammogram: no      Family history of uterine or ovarian cancer: no  Family history of breast cancer: yes - MGM  Family history of colon cancer: no      Menstrual History:  OB History          1    Para        Term                AB   1    Living   0         SAB   1    IAB        Ectopic        Multiple        Live Births                        Patient's last menstrual period was 2024.         Past Medical History:   Diagnosis Date    Abnormal Pap smear of cervix     Celiac disease     Celiac disease     Dental abscess     HPV (human papilloma virus) anogenital infection     HPV (human papilloma virus) infection     Severe dysplasia of cervix (JOSE III)     Spontaneous      Spontaneous pneumothorax     right pneumothorax, treated with right VATS, bleb resection, apical pleurectomy 2012     Past Surgical History:   Procedure Laterality Date    CERVICAL BIOPSY N/A 2019    Procedure: BIOPSY CONE/COLD KNIFE CERVIX;  Surgeon: Dotty Ibrahim MD;  Location: [facility];  Service: Gynecology    CERVICAL BIOPSY  W/ LOOP ELECTRODE EXCISION  2018    COLPOSCOPY      DILATION AND CURETTAGE OF UTERUS      ESOPHAGOGASTRODUODENOSCOPY  2015    THORACOSCOPY      (Therapeutic) with pleurodesis     Family History   Problem Relation Age of Onset    Hypertension Mother     Hypothyroidism Father     Diabetes Brother     Breast cancer Maternal  "Grandmother     Liver cancer Maternal Grandfather     Hypertension Maternal Grandfather     Ovarian cancer Neg Hx     Colon cancer Neg Hx        Social History     Tobacco Use    Smoking status: Every Day     Current packs/day: 0.50     Types: Cigarettes    Smokeless tobacco: Never   Vaping Use    Vaping status: Never Used   Substance Use Topics    Alcohol use: Not Currently    Drug use: Not Currently          Current Outpatient Medications:     Norethin-Eth Estradiol-Fe 0.8-25 MG-MCG CHEW, Chew 1 tablet daily, Disp: 90 tablet, Rfl: 4    ibuprofen (MOTRIN) 200 mg tablet, Take 2 tablets (400 mg total) by mouth every 6 (six) hours as needed for mild pain, Disp: 40 tablet, Rfl: 0    loratadine (CLARITIN) 10 mg tablet, Take 10 mg by mouth daily, Disp: , Rfl:     Probiotic Product (PROBIOTIC DAILY PO), Take 1 capsule by mouth daily, Disp: , Rfl:     Allergies   Allergen Reactions    Gluten Meal - Food Allergy            Review of Systems   Constitutional:  Negative for appetite change, chills and fever.   Eyes:  Negative for visual disturbance.   Respiratory:  Negative for cough, chest tightness and shortness of breath.    Cardiovascular:  Negative for chest pain.   Gastrointestinal:  Negative for abdominal distention, abdominal pain, constipation, diarrhea, nausea and vomiting.   Endocrine: Negative for cold intolerance and heat intolerance.   Genitourinary:  Negative for difficulty urinating, dyspareunia, dysuria, frequency, genital sores, pelvic pain, urgency, vaginal bleeding, vaginal discharge and vaginal pain.   Musculoskeletal:  Negative for arthralgias.   Neurological:  Negative for light-headedness and headaches.   Hematological:  Does not bruise/bleed easily.   Psychiatric/Behavioral:  Negative for behavioral problems.    All other systems reviewed and are negative.      /72   Pulse (!) 134   Ht 5' 5\" (1.651 m)   Wt 54.7 kg (120 lb 9.6 oz)   LMP 08/12/2024   BMI 20.07 kg/m²         Physical " Exam  Constitutional:       General: She is not in acute distress.     Appearance: Normal appearance.   Genitourinary:      Vulva, bladder and urethral meatus normal.      No lesions in the vagina.      Right Labia: No rash, tenderness, lesions or skin changes.     Left Labia: No tenderness, lesions, skin changes or rash.     No labial fusion noted.      No inguinal adenopathy present in the right or left side.     No vaginal discharge, erythema, tenderness, bleeding or ulceration.      No vaginal prolapse present.       Right Adnexa: not tender, not full and no mass present.     Left Adnexa: not tender, not full and no mass present.     No cervical motion tenderness, discharge, friability, lesion or polyp.      Uterus is not enlarged, fixed, tender or irregular.      No uterine mass detected.     Uterus is anteverted.      Pelvic exam was performed with patient in the lithotomy position.   Breasts:     Right: No swelling, bleeding, inverted nipple, mass, nipple discharge, skin change or tenderness.      Left: No swelling, bleeding, inverted nipple, mass, nipple discharge, skin change or tenderness.   HENT:      Head: Normocephalic and atraumatic.   Neck:      Thyroid: No thyromegaly.   Cardiovascular:      Rate and Rhythm: Normal rate and regular rhythm.   Pulmonary:      Effort: Pulmonary effort is normal. No accessory muscle usage or respiratory distress.   Abdominal:      General: There is no distension.      Palpations: Abdomen is soft.      Tenderness: There is no abdominal tenderness. There is no guarding or rebound.   Musculoskeletal:         General: Normal range of motion.      Cervical back: Normal range of motion and neck supple.   Lymphadenopathy:      Upper Body:      Right upper body: No supraclavicular or axillary adenopathy.      Left upper body: No supraclavicular or axillary adenopathy.      Lower Body: No right inguinal and no right inguinal adenopathy. No left inguinal and no left inguinal  adenopathy.   Neurological:      General: No focal deficit present.      Mental Status: She is alert.   Skin:     General: Skin is warm and dry.      Findings: No erythema.   Psychiatric:         Mood and Affect: Mood normal.         Behavior: Behavior normal.   Vitals and nursing note reviewed. Exam conducted with a chaperone present.               Encounter for annual routine gynecological examination  - Discussed ACOG guidelines for pap smear screening frequency: performed today  - Discussed healthy lifestyle recommendations for diet, exercise and self breast awareness.  - Discussed ACOG recommendations for screening mammograms: not indicated today.  - Discussed age based recommendations for adequate calcium and vitamin D intake. No additional osteoporosis screening indicated at this time.  - Discussed ACOG recommendations for colon cancer screening: not indicated at this time.  - Safe sex practices were discussed and STI testing was not desired by the patient  - Contraceptive options were reviewed: OCP refilled   - Routine follow up in 1 year was recommended or sooner as needed. All questions and concerns were addressed.       Encounter for preconception consultation  Preconception counseling was performed for patient.  She was prescribed prenatal vitamins to start.  Discussed with patient maintaining a healthy lifestyle with regular exercise and that maintaining a healthy weight is associated with more optimal pregnancy outcomes.   She was advised to avoid smoking/secondhand smoke exposure, excessive alcohol consumption, and illicit drug use as these can both impair fertility and be harmful to an early pregnancy.  She was advised to limit caffeine intake to approximately 200 mg per day.  Discussed taking a prenatal vitamin daily with at least 400 mcg of folic acid.  Also discussed with patient optimization of natural fertility including timed intercourse and coital frequency.  Discussed potential implications  of prior LEEP and CKC, universal cervical length screening at time of level 2 US

## 2024-08-26 NOTE — ASSESSMENT & PLAN NOTE
- Discussed ACOG guidelines for pap smear screening frequency: performed today  - Discussed healthy lifestyle recommendations for diet, exercise and self breast awareness.  - Discussed ACOG recommendations for screening mammograms: not indicated today.  - Discussed age based recommendations for adequate calcium and vitamin D intake. No additional osteoporosis screening indicated at this time.  - Discussed ACOG recommendations for colon cancer screening: not indicated at this time.  - Safe sex practices were discussed and STI testing was not desired by the patient  - Contraceptive options were reviewed: OCP refilled   - Routine follow up in 1 year was recommended or sooner as needed. All questions and concerns were addressed.

## 2024-09-03 LAB
LAB AP GYN PRIMARY INTERPRETATION: ABNORMAL
Lab: ABNORMAL
PATH INTERP SPEC-IMP: ABNORMAL

## 2024-09-25 PROBLEM — Z01.419 ENCOUNTER FOR ANNUAL ROUTINE GYNECOLOGICAL EXAMINATION: Status: RESOLVED | Noted: 2024-08-26 | Resolved: 2024-09-25

## 2024-11-08 NOTE — PROGRESS NOTES
[facility]enterology  Gastroenterology Outpatient Consultation  Patient Claudia Smalls   Age 34 y.o.   Gender female   MRN: [id number]  [facility] [id number]     ASSESSMENT AND PLAN:   Problem List Items Addressed This Visit          Respiratory    Oropharyngeal dysphagia     Claudia reports having what sounds to be difficulty initiating the swallow at times.  It does not sound like she is having any esophageal dysphagia but it is not entirely clear.  The symptoms started about 4-1/2 years ago and are improving to some degree.  I would recommend further evaluation with videofluoroscopic swallowing examination with speech therapy.  In addition I would recommend an esophagram with barium tablet.  I would also recommend scheduling her for upper endoscopy.  In addition I did recommend ENT consultation as she describes an unusual feeling under her tongue.  I did not see anything obvious on physical examination.    I explained to the patient the procedure of upper endoscopy as well as its potential risk which are approximately 1 in 1000 chance of bleeding, infection, and perforation.           Relevant Orders    FL barium swallow video w speech    FL barium swallow w air contrast    EGD       Digestive    Celiac disease - Primary     Claudia was diagnosed with celiac disease back in based upon laboratory data and EGD performed on 1/21/2016.  She has been on a strict gluten-free diet.  She has not had laboratory testing in quite some time.  For this reason I would recommend CBC and chemistry complete.  These should probably be done yearly.  I would also recommend celiac antibodies to evaluate for any inadvertent gluten exposure and this should probably be done twice a year.  Vitamin D and thyroid function should be done yearly.  Check iron studies check vitamin B12 and check folate levels.         Relevant Orders    FL barium swallow w air contrast    Comprehensive metabolic panel    Folate    Iron Panel (Includes  "Ferritin, Iron Sat%, Iron, and TIBC)    Vitamin B12    Vitamin D 25 hydroxy    TSH, 3rd generation with Free T4 reflex    CBC    Celiac Disease Panel    GERD (gastroesophageal reflux disease)     Prior to diagnosis of celiac disease on a gluten-free diet she did report a lot of heartburn.  Now maybe 1 to 2 days a week she will briefly feel some acid in her esophagus.  I did suggest a trial of famotidine at 20 mg nightly, she will check with the pharmacist to see if this is gluten-free.  If not she should let us know.  Lifestyle modifications for gastroesophageal reflux disease were discussed and include limiting fried and fatty foods, mints, chocolates, carbonated and caffeinated beverages , and alcohol, etc.  Avoid lying down for 2-3 hours after meals.  If you have nighttime symptoms consider raising the head of the bed up on 4-6 inch blocks.  Pillows typically are not useful.  If you are overweight, weight loss will be helpful.           Relevant Medications    famotidine (PEPCID) 20 mg tablet     Other Visit Diagnoses       Tongue disorder        Relevant Orders    Ambulatory Referral to Otolaryngology           _____________________________________________________________    HPI:   Claudia is a delightful 34-year-old woman whom seen in the office in consultation.  She does have a history of celiac disease diagnosed about 9 years ago through EGD with small bowel biopsy and celiac antibody positivity.  She presents for evaluation of questionable dysphagia.  States that about 4-1/2 years ago around the onset of the COVID pandemic she developed what she calls \" bad stomach issues\".  She reported diarrhea excetra.  The same time she found it difficult to swallow foods.  She would chew her food and feels if it was not going to go down so she would just spit it out.  She did not feel as if food was sticking in her esophagus.  This was happening fairly frequently but as of late she is not noticing as frequent.  If she " has this sensation or feeling she will stop eating and then after period time should be fine and go back to eating again.  Once again the symptoms getting better.  She does feel like she needs to swish some liquid around her mouth with eating to help her with swallowing but she does not feel that she has a dry mouth.  She also describes an unusual feeling under her tongue but it does not hurt.  She did have problems with reflux but they improved since going on a gluten-free diet.  She may feel acid in her chest for a minute or 2 maybe 1-2 times a week but does not have frequent heartburn.  Denies any nausea or vomiting.  Her bowel pattern is very regular for the most part.  Denies any rectal bleeding or black stools.  She may have diarrhea 1 time per month.  She is extremely sensitive to gluten and knows if she has any gluten exposure.  She does follow a strict gluten-free diet.    She denies any significant weight loss.  Denies any rash    She has a great aunt with celiac disease.  2 uncles have celiac disease  A cousin has celiac disease  There is no family history colon cancer, colon polyps, or esophageal cancer  A brother and cousin have type 1 diabetes    She does smoke about 1/2 to 3/4 pack of cigarettes per day.  She does not drink alcohol.    1/21/2016 EGD Dr. Nemesio Cruz  Normal esophagus  Normal stomach  Normal duodenum  Biopsy duodenal bulb and second portion revealed partially developed sprue like changes  Reportedly positive TTG IgA and endomysial antibody did not have lab results    Allergies   Allergen Reactions    Gluten Meal - Food Allergy      Current Outpatient Medications   Medication Sig Dispense Refill    famotidine (PEPCID) 20 mg tablet Take 1 tablet (20 mg total) by mouth daily Take 2 hours before bed nightly 30 tablet 4    ibuprofen (MOTRIN) 200 mg tablet Take 2 tablets (400 mg total) by mouth every 6 (six) hours as needed for mild pain 40 tablet 0    loratadine (CLARITIN) 10 mg tablet Take  "10 mg by mouth daily      Norethin-Eth Estradiol-Fe 0.8-25 MG-MCG CHEW Chew 1 tablet daily 90 tablet 4    Probiotic Product (PROBIOTIC DAILY PO) Take 1 capsule by mouth daily       No current facility-administered medications for this visit.     MEDICAL HISTORY:  Past Medical History:   Diagnosis Date    Abnormal Pap smear of cervix     Celiac disease     Celiac disease     Dental abscess     HPV (human papilloma virus) anogenital infection     HPV (human papilloma virus) infection     Severe dysplasia of cervix (JOSE III)     Spontaneous      Spontaneous pneumothorax     right pneumothorax, treated with right VATS, bleb resection, apical pleurectomy 2012     Past Surgical History:   Procedure Laterality Date    CERVICAL BIOPSY N/A 2019    Procedure: BIOPSY CONE/COLD KNIFE CERVIX;  Surgeon: Dotty Ibrahim MD;  Location: [facility];  Service: Gynecology    CERVICAL BIOPSY  W/ LOOP ELECTRODE EXCISION      COLPOSCOPY      DILATION AND CURETTAGE OF UTERUS      ESOPHAGOGASTRODUODENOSCOPY  2015    THORACOSCOPY      (Therapeutic) with pleurodesis     Social History     Substance and Sexual Activity   Alcohol Use Not Currently     Social History     Substance and Sexual Activity   Drug Use Not Currently     Social History     Tobacco Use   Smoking Status Every Day    Current packs/day: 0.50    Types: Cigarettes   Smokeless Tobacco Never     Family History   Problem Relation Age of Onset    Hypertension Mother     Hypothyroidism Father     Diabetes Brother     Breast cancer Maternal Grandmother     Liver cancer Maternal Grandfather     Hypertension Maternal Grandfather     Ovarian cancer Neg Hx     Colon cancer Neg Hx        Objective   Blood pressure 100/60, pulse 91, temperature 98.3 °F (36.8 °C), temperature source Temporal, height 5' 5.75\" (1.67 m), weight 55.8 kg (123 lb), SpO2 99%. Body mass index is 20 kg/m².    PHYSICAL EXAM:   General Appearance: Alert, cooperative, no " distress  HEENT: Normocephalic, atraumatic, anicteric.   Neck: Supple, symmetrical, trachea midline  Lungs: Clear to auscultation bilaterally; no rales, rhonchi or wheezing; respirations unlabored   Heart: Regular rate and rhythm; no murmur, rub, or gallop.  Abdomen: Soft, bowel sounds normal, non-tender, non-distended; no masses, there is no hepatosplenomegaly. No spider angiomas  Genitalia: Deferred   Rectal: Deferred   Extremities: No cyanosis, clubbing or edema   Skin: No jaundice, rashes, or lesions   Lymph nodes: No palpable cervical lymphadenopathy   Lab Results:   No visits with results within 2 Month(s) from this visit.   Latest known visit with results is:   Annual Exam on 08/26/2024   Component Date Value    Case Report 08/26/2024                      Value:Gynecologic Cytology Report                       Case: [id number]                                  Authorizing Provider:  Eveline Lara MD      Collected:           08/26/2024 0934              Ordering Location:     [facility] Complete  Received:            08/26/2024 0934                                     [facility]                                                         First Screen:          Maria G Rowe                                                            Pathologist:           Charles Edouard MD                                                    Specimen:    LIQUID-BASED PAP, SCREENING, Cervix, Endocervical                                          Primary Interpretation 08/26/2024 Epithelial cell abnormality     Interpretation 08/26/2024 Atypical squamous cells of undetermined significance     Specimen Adequacy 08/26/2024 Satisfactory for evaluation. Absence of endocervical/transformation zone component.     Additional Information 08/26/2024                      Value:[employer]'s FDA approved ,  and ThinPrep Imaging Duo System are utilized with strict adherence to the 's instruction  manual to prepare gynecologic and non-gynecologic cytology specimens for the production of ThinPrep slides as well as for gynecologic ThinPrep imaging. These processes have been validated by our laboratory and/or by the .  The Pap test is not a diagnostic procedure and should not be used as the sole means to detect cervical cancer. It is only a screening procedure to aid in the detection of cervical cancer and its precursors. Both false-negative and false-positive results have been experienced. Your patient's test result should be interpreted in this context together with the history and clinical findings.  Interpretation performed at [facility], [place], [address] [place], [place] [id number]      Gross Description 08/26/2024                      Value:A. 20 ml , colorless, cloudy received in a ThinPrep vial.      HPV Other HR 08/26/2024 Negative     HPV16 08/26/2024 Negative     HPV18 08/26/2024 Negative      Radiology Results:   No results found.  Anderson Santana, DO   11/11/24   Cc:

## 2024-11-11 ENCOUNTER — CONSULT (OUTPATIENT)
Dept: GASTROENTEROLOGY | Facility: CLINIC | Age: 34
End: 2024-11-11
Payer: COMMERCIAL

## 2024-11-11 ENCOUNTER — LAB (OUTPATIENT)
Dept: LAB | Facility: CLINIC | Age: 34
End: 2024-11-11
Payer: COMMERCIAL

## 2024-11-11 VITALS
DIASTOLIC BLOOD PRESSURE: 60 MMHG | HEIGHT: 66 IN | OXYGEN SATURATION: 99 % | BODY MASS INDEX: 19.77 KG/M2 | HEART RATE: 91 BPM | TEMPERATURE: 98.3 F | SYSTOLIC BLOOD PRESSURE: 100 MMHG | WEIGHT: 123 LBS

## 2024-11-11 DIAGNOSIS — E53.8 FOLATE DEFICIENCY: Primary | ICD-10-CM

## 2024-11-11 DIAGNOSIS — E53.8 VITAMIN B12 DEFICIENCY: ICD-10-CM

## 2024-11-11 DIAGNOSIS — R13.12 OROPHARYNGEAL DYSPHAGIA: ICD-10-CM

## 2024-11-11 DIAGNOSIS — E53.8 FOLATE DEFICIENCY: ICD-10-CM

## 2024-11-11 DIAGNOSIS — K90.0 CELIAC DISEASE: Primary | ICD-10-CM

## 2024-11-11 DIAGNOSIS — K90.0 CELIAC DISEASE: ICD-10-CM

## 2024-11-11 DIAGNOSIS — K21.9 GASTROESOPHAGEAL REFLUX DISEASE, UNSPECIFIED WHETHER ESOPHAGITIS PRESENT: ICD-10-CM

## 2024-11-11 DIAGNOSIS — K14.9 TONGUE DISORDER: ICD-10-CM

## 2024-11-11 LAB
25(OH)D3 SERPL-MCNC: 19.6 NG/ML (ref 30–100)
ALBUMIN SERPL BCG-MCNC: 4.1 G/DL (ref 3.5–5)
ALP SERPL-CCNC: 42 U/L (ref 34–104)
ALT SERPL W P-5'-P-CCNC: 9 U/L (ref 7–52)
ANION GAP SERPL CALCULATED.3IONS-SCNC: 7 MMOL/L (ref 4–13)
AST SERPL W P-5'-P-CCNC: 15 U/L (ref 13–39)
BILIRUB SERPL-MCNC: 0.55 MG/DL (ref 0.2–1)
BUN SERPL-MCNC: 13 MG/DL (ref 5–25)
CALCIUM SERPL-MCNC: 8.7 MG/DL (ref 8.4–10.2)
CHLORIDE SERPL-SCNC: 105 MMOL/L (ref 96–108)
CO2 SERPL-SCNC: 26 MMOL/L (ref 21–32)
CREAT SERPL-MCNC: 0.57 MG/DL (ref 0.6–1.3)
ERYTHROCYTE [DISTWIDTH] IN BLOOD BY AUTOMATED COUNT: 12.3 % (ref 11.6–15.1)
FERRITIN SERPL-MCNC: 59 NG/ML (ref 11–307)
FOLATE SERPL-MCNC: 2.1 NG/ML
GFR SERPL CREATININE-BSD FRML MDRD: 123 ML/MIN/1.73SQ M
GLIADIN PEPTIDE IGA SER-ACNC: 2 U/ML
GLIADIN PEPTIDE IGA SER-ACNC: NEGATIVE
GLIADIN PEPTIDE IGG SER-ACNC: 0.5 U/ML
GLIADIN PEPTIDE IGG SER-ACNC: NEGATIVE
GLUCOSE P FAST SERPL-MCNC: 88 MG/DL (ref 65–99)
HCT VFR BLD AUTO: 37.3 % (ref 34.8–46.1)
HGB BLD-MCNC: 13 G/DL (ref 11.5–15.4)
IGA SERPL-MCNC: 70 MG/DL (ref 66–433)
IRON SATN MFR SERPL: 42 % (ref 15–50)
IRON SERPL-MCNC: 164 UG/DL (ref 50–212)
MCH RBC QN AUTO: 40.6 PG (ref 26.8–34.3)
MCHC RBC AUTO-ENTMCNC: 34.9 G/DL (ref 31.4–37.4)
MCV RBC AUTO: 117 FL (ref 82–98)
PLATELET # BLD AUTO: 266 THOUSANDS/UL (ref 149–390)
PMV BLD AUTO: 9.1 FL (ref 8.9–12.7)
POTASSIUM SERPL-SCNC: 4.1 MMOL/L (ref 3.5–5.3)
PROT SERPL-MCNC: 6.9 G/DL (ref 6.4–8.4)
RBC # BLD AUTO: 3.2 MILLION/UL (ref 3.81–5.12)
SODIUM SERPL-SCNC: 138 MMOL/L (ref 135–147)
T4 FREE SERPL-MCNC: 0.61 NG/DL (ref 0.61–1.12)
TIBC SERPL-MCNC: 391 UG/DL (ref 250–450)
TSH SERPL DL<=0.05 MIU/L-ACNC: 0.43 UIU/ML (ref 0.45–4.5)
TTG IGA SER-ACNC: 1.5 U/ML
TTG IGA SER-ACNC: NEGATIVE
TTG IGG SER-ACNC: 1.8 U/ML
TTG IGG SER-ACNC: NEGATIVE
UIBC SERPL-MCNC: 227 UG/DL (ref 155–355)
VIT B12 SERPL-MCNC: 124 PG/ML (ref 180–914)
WBC # BLD AUTO: 5.86 THOUSAND/UL (ref 4.31–10.16)

## 2024-11-11 PROCEDURE — 84443 ASSAY THYROID STIM HORMONE: CPT

## 2024-11-11 PROCEDURE — 99204 OFFICE O/P NEW MOD 45 MIN: CPT | Performed by: INTERNAL MEDICINE

## 2024-11-11 PROCEDURE — 82306 VITAMIN D 25 HYDROXY: CPT

## 2024-11-11 PROCEDURE — 82607 VITAMIN B-12: CPT

## 2024-11-11 PROCEDURE — 86364 TISS TRNSGLTMNASE EA IG CLAS: CPT

## 2024-11-11 PROCEDURE — 80053 COMPREHEN METABOLIC PANEL: CPT

## 2024-11-11 PROCEDURE — 83540 ASSAY OF IRON: CPT

## 2024-11-11 PROCEDURE — 86340 INTRINSIC FACTOR ANTIBODY: CPT

## 2024-11-11 PROCEDURE — 85027 COMPLETE CBC AUTOMATED: CPT

## 2024-11-11 PROCEDURE — 36415 COLL VENOUS BLD VENIPUNCTURE: CPT

## 2024-11-11 PROCEDURE — 84439 ASSAY OF FREE THYROXINE: CPT

## 2024-11-11 PROCEDURE — 86258 DGP ANTIBODY EACH IG CLASS: CPT

## 2024-11-11 PROCEDURE — 82746 ASSAY OF FOLIC ACID SERUM: CPT

## 2024-11-11 PROCEDURE — 82728 ASSAY OF FERRITIN: CPT

## 2024-11-11 PROCEDURE — 83516 IMMUNOASSAY NONANTIBODY: CPT

## 2024-11-11 PROCEDURE — 82784 ASSAY IGA/IGD/IGG/IGM EACH: CPT

## 2024-11-11 PROCEDURE — 83550 IRON BINDING TEST: CPT

## 2024-11-11 RX ORDER — FAMOTIDINE 20 MG/1
20 TABLET, FILM COATED ORAL DAILY
Qty: 30 TABLET | Refills: 4 | Status: SHIPPED | OUTPATIENT
Start: 2024-11-11

## 2024-11-11 RX ORDER — FOLIC ACID 0.8 MG
800 TABLET ORAL DAILY
Qty: 30 TABLET | Refills: 5 | Status: SHIPPED | OUTPATIENT
Start: 2024-11-11

## 2024-11-11 RX ORDER — SODIUM CHLORIDE, SODIUM LACTATE, POTASSIUM CHLORIDE, CALCIUM CHLORIDE 600; 310; 30; 20 MG/100ML; MG/100ML; MG/100ML; MG/100ML
125 INJECTION, SOLUTION INTRAVENOUS CONTINUOUS
OUTPATIENT
Start: 2024-11-11

## 2024-11-11 NOTE — ASSESSMENT & PLAN NOTE
Claudia was diagnosed with celiac disease back in based upon laboratory data and EGD performed on 1/21/2016.  She has been on a strict gluten-free diet.  She has not had laboratory testing in quite some time.  For this reason I would recommend CBC and chemistry complete.  These should probably be done yearly.  I would also recommend celiac antibodies to evaluate for any inadvertent gluten exposure and this should probably be done twice a year.  Vitamin D and thyroid function should be done yearly.  Check iron studies check vitamin B12 and check folate levels.

## 2024-11-11 NOTE — ASSESSMENT & PLAN NOTE
Claudia reports having what sounds to be difficulty initiating the swallow at times.  It does not sound like she is having any esophageal dysphagia but it is not entirely clear.  The symptoms started about 4-1/2 years ago and are improving to some degree.  I would recommend further evaluation with videofluoroscopic swallowing examination with speech therapy.  In addition I would recommend an esophagram with barium tablet.  I would also recommend scheduling her for upper endoscopy.  In addition I did recommend ENT consultation as she describes an unusual feeling under her tongue.  I did not see anything obvious on physical examination.    I explained to the patient the procedure of upper endoscopy as well as its potential risk which are approximately 1 in 1000 chance of bleeding, infection, and perforation.

## 2024-11-11 NOTE — ASSESSMENT & PLAN NOTE
Prior to diagnosis of celiac disease on a gluten-free diet she did report a lot of heartburn.  Now maybe 1 to 2 days a week she will briefly feel some acid in her esophagus.  I did suggest a trial of famotidine at 20 mg nightly, she will check with the pharmacist to see if this is gluten-free.  If not she should let us know.  Lifestyle modifications for gastroesophageal reflux disease were discussed and include limiting fried and fatty foods, mints, chocolates, carbonated and caffeinated beverages , and alcohol, etc.  Avoid lying down for 2-3 hours after meals.  If you have nighttime symptoms consider raising the head of the bed up on 4-6 inch blocks.  Pillows typically are not useful.  If you are overweight, weight loss will be helpful.

## 2024-11-11 NOTE — PATIENT INSTRUCTIONS
Oropharyngeal dysphagia  Claudia reports having what sounds to be difficulty initiating the swallow at times.  It does not sound like she is having any esophageal dysphagia but it is not entirely clear.  The symptoms started about 4-1/2 years ago and are improving to some degree.  I would recommend further evaluation with videofluoroscopic swallowing examination with speech therapy.  In addition I would recommend an esophagram with barium tablet.  I would also recommend scheduling her for upper endoscopy.  In addition I did recommend ENT consultation as she describes an unusual feeling under her tongue.  I did not see anything obvious on physical examination.    I explained to the patient the procedure of upper endoscopy as well as its potential risk which are approximately 1 in 1000 chance of bleeding, infection, and perforation.      Celiac disease  Claudia was diagnosed with celiac disease back in based upon laboratory data and EGD performed on 1/21/2016.  She has been on a strict gluten-free diet.  She has not had laboratory testing in quite some time.  For this reason I would recommend CBC and chemistry complete.  These should probably be done yearly.  I would also recommend celiac antibodies to evaluate for any inadvertent gluten exposure and this should probably be done twice a year.  Vitamin D and thyroid function should be done yearly.  Check iron studies check vitamin B12 and check folate levels.    GERD (gastroesophageal reflux disease)  Prior to diagnosis of celiac disease on a gluten-free diet she did report a lot of heartburn.  Now maybe 1 to 2 days a week she will briefly feel some acid in her esophagus.  I did suggest a trial of famotidine at 20 mg nightly, she will check with the pharmacist to see if this is gluten-free.  If not she should let us know.  Lifestyle modifications for gastroesophageal reflux disease were discussed and include limiting fried and fatty foods, mints, chocolates,  carbonated and caffeinated beverages , and alcohol, etc.  Avoid lying down for 2-3 hours after meals.  If you have nighttime symptoms consider raising the head of the bed up on 4-6 inch blocks.  Pillows typically are not useful.  If you are overweight, weight loss will be helpful.

## 2024-11-12 ENCOUNTER — TELEPHONE (OUTPATIENT)
Dept: FAMILY MEDICINE CLINIC | Facility: CLINIC | Age: 34
End: 2024-11-12

## 2024-11-12 ENCOUNTER — TELEPHONE (OUTPATIENT)
Dept: GASTROENTEROLOGY | Facility: CLINIC | Age: 34
End: 2024-11-12

## 2024-11-12 ENCOUNTER — TELEPHONE (OUTPATIENT)
Age: 34
End: 2024-11-12

## 2024-11-12 ENCOUNTER — TELEPHONE (OUTPATIENT)
Dept: OTHER | Facility: OTHER | Age: 34
End: 2024-11-12

## 2024-11-12 NOTE — TELEPHONE ENCOUNTER
----- Message from Anderson Santana DO sent at 11/11/2024  7:32 PM EST -----  Please inform patient that laboratory data revealed a chemistry panel to be normal.  She has a low vitamin D level at 19.6.  She should be taking at a minimum 2000 international units of vitamin D3 however I suspect her primary care provider may want her on a higher dose, she should check with her primary care provider regarding vitamin D supplementation.  Her folate is low at 2.1 and her vitamin B12 is very low.  Please see if the lab can add on parietal cell and intrinsic factor antibodies I will place the order now.  She should start using vitamin B12 supplementation 1000 mcg daily.  She should  the sublingual (under the tongue) formula and take that daily until she can see her primary care provider to receive vitamin B12 shots.  Would recommend 1000 mcg of vitamin B12 IM weekly x 2 weeks then monthly.  Repeat vitamin B12 and folate in 4 months.  I would also recommend that she start taking folic acid 800 micrograms daily.  I will place an order for this as well. Sent to pharmacy.  TSH, thyroid function is just a little bit low.  Doubt this represents an overactive thyroid but again she should also discuss this with her primary care provider.  The CBC did reveal a very high MCV, this could be secondary to folate and B12 deficiency.  The celiac blood tests are all negative suggesting that she is not getting any inadvertent gluten exposure.  Iron studies were normal. I will copy her primary care provider as an fyi

## 2024-11-12 NOTE — TELEPHONE ENCOUNTER
"Pt stated, \"I I would like the office to call me to schedule an appointment regarding my blood work results. I also have questions regarding them.\"    Please call pt when office reopens   "

## 2024-11-12 NOTE — TELEPHONE ENCOUNTER
Reviewed results and recommendations and pt verbalizes understanding.   Also sent recommendations/results to pt via [employer] per request.

## 2024-11-12 NOTE — TELEPHONE ENCOUNTER
LM on VM to call back regarding lab results and recommendations, I did call the lab and add the two tests to the blood that was drawn the other day.

## 2024-11-12 NOTE — TELEPHONE ENCOUNTER
LMOM ok to tell pt the note below----- Message from Kinjal Faith DO sent at 11/12/2024  5:40 PM EST -----  If you can please reach out to the patient and let her know that she should schedule a follow-up at her earliest convenience so that we may develop a plan to address her multiple vitamin deficiencies as well as abnormal thyroid function labs.  ----- Message -----  From: Anderson Vital DO Max  Sent: 11/11/2024   7:32 PM EST  To: Kinjal Faith DO; #    Please inform patient that laboratory data revealed a chemistry panel to be normal.  She has a low vitamin D level at 19.6.  She should be taking at a minimum 2000 international units of vitamin D3 however I suspect her primary care provider may want her on a higher dose, she should check with her primary care provider regarding vitamin D supplementation.  Her folate is low at 2.1 and her vitamin B12 is very low.  Please see if the lab can add on parietal cell and intrinsic factor antibodies I will place the order now.  She should start using vitamin B12 supplementation 1000 mcg daily.  She should  the sublingual (under the tongue) formula and take that daily until she can see her primary care provider to receive vitamin B12 shots.  Would recommend 1000 mcg of vitamin B12 IM weekly x 2 weeks then monthly.  Repeat vitamin B12 and folate in 4 months.  I would also recommend that she start taking folic acid 800 micrograms daily.  I will place an order for this as well. Sent to pharmacy.  TSH, thyroid function is just a little bit low.  Doubt this represents an overactive thyroid but again she should also discuss this with her primary care provider.  The CBC did reveal a very high MCV, this could be secondary to folate and B12 deficiency.  The celiac blood tests are all negative suggesting that she is not getting any inadvertent gluten exposure.  Iron studies were normal. I will copy her primary care provider as an fyi

## 2024-11-12 NOTE — TELEPHONE ENCOUNTER
Patient called saying that she is returning a home call that she had gotten from the office regarding scheduling an appointment. Patient is asking if the office can give her a call back to schedule the appointment in regarding her test results.

## 2024-11-12 NOTE — RESULT ENCOUNTER NOTE
Please inform patient that laboratory data revealed a chemistry panel to be normal.  She has a low vitamin D level at 19.6.  She should be taking at a minimum 2000 international units of vitamin D3 however I suspect her primary care provider may want her on a higher dose, she should check with her primary care provider regarding vitamin D supplementation.  Her folate is low at 2.1 and her vitamin B12 is very low.  Please see if the lab can add on parietal cell and intrinsic factor antibodies I will place the order now.  She should start using vitamin B12 supplementation 1000 mcg daily.  She should  the sublingual (under the tongue) formula and take that daily until she can see her primary care provider to receive vitamin B12 shots.  Would recommend 1000 mcg of vitamin B12 IM weekly x 2 weeks then monthly.  Repeat vitamin B12 and folate in 4 months.  I would also recommend that she start taking folic acid 800 micrograms daily.  I will place an order for this as well. Sent to pharmacy.  TSH, thyroid function is just a little bit low.  Doubt this represents an overactive thyroid but again she should also discuss this with her primary care provider.  The CBC did reveal a very high MCV, this could be secondary to folate and B12 deficiency.  The celiac blood tests are all negative suggesting that she is not getting any inadvertent gluten exposure.  Iron studies were normal. I will copy her primary care provider as an fyi

## 2024-11-13 LAB — PCA AB SER-ACNC: 1.9 UNITS (ref 0–20)

## 2024-11-14 ENCOUNTER — RESULTS FOLLOW-UP (OUTPATIENT)
Dept: GASTROENTEROLOGY | Facility: CLINIC | Age: 34
End: 2024-11-14

## 2024-11-14 LAB — IF BLOCK AB SER QL RIA: 1.1 AU/ML (ref 0–1.1)

## 2024-11-14 NOTE — RESULT ENCOUNTER NOTE
Please inform patient that blood test that were done to evaluate for pernicious anemia, which could be a cause of B12 deficiency were negative.  Thank you

## 2024-11-25 DIAGNOSIS — E53.8 VITAMIN B12 DEFICIENCY: ICD-10-CM

## 2024-11-25 DIAGNOSIS — E53.8 FOLATE DEFICIENCY: ICD-10-CM

## 2024-11-26 RX ORDER — FOAM BANDAGE 3" X 3"
BANDAGE TOPICAL
Qty: 90 TABLET | Refills: 1 | Status: SHIPPED | OUTPATIENT
Start: 2024-11-26

## 2024-11-29 ENCOUNTER — OFFICE VISIT (OUTPATIENT)
Dept: FAMILY MEDICINE CLINIC | Facility: CLINIC | Age: 34
End: 2024-11-29
Payer: COMMERCIAL

## 2024-11-29 VITALS
DIASTOLIC BLOOD PRESSURE: 80 MMHG | HEIGHT: 66 IN | SYSTOLIC BLOOD PRESSURE: 130 MMHG | TEMPERATURE: 98.8 F | BODY MASS INDEX: 19.8 KG/M2 | HEART RATE: 90 BPM | WEIGHT: 123.2 LBS | OXYGEN SATURATION: 98 %

## 2024-11-29 DIAGNOSIS — E53.8 FOLATE DEFICIENCY: ICD-10-CM

## 2024-11-29 DIAGNOSIS — Z00.00 ANNUAL PHYSICAL EXAM: Primary | ICD-10-CM

## 2024-11-29 DIAGNOSIS — E05.90 SUBCLINICAL HYPERTHYROIDISM: ICD-10-CM

## 2024-11-29 DIAGNOSIS — E55.9 VITAMIN D DEFICIENCY: ICD-10-CM

## 2024-11-29 DIAGNOSIS — E53.8 VITAMIN B12 DEFICIENCY: ICD-10-CM

## 2024-11-29 PROCEDURE — 99213 OFFICE O/P EST LOW 20 MIN: CPT | Performed by: FAMILY MEDICINE

## 2024-11-29 PROCEDURE — 99395 PREV VISIT EST AGE 18-39: CPT | Performed by: FAMILY MEDICINE

## 2024-11-29 NOTE — PATIENT INSTRUCTIONS
"Patient Education     Routine physical for adults   The Basics   Written by the doctors and editors at [facility]   What is a physical? -- A physical is a routine visit, or \"check-up,\" with your doctor. You might also hear it called a \"wellness visit\" or \"preventive visit.\"  During each visit, the doctor will:   Ask about your physical and mental health   Ask about your habits, behaviors, and lifestyle   Do an exam   Give you vaccines if needed   Talk to you about any medicines you take   Give advice about your health   Answer your questions  Getting regular check-ups is an important part of taking care of your health. It can help your doctor find and treat any problems you have. But it's also important for preventing health problems.  A routine physical is different from a \"sick visit.\" A sick visit is when you see a doctor because of a health concern or problem. Since physicals are scheduled ahead of time, you can think about what you want to ask the doctor.  How often should I get a physical? -- It depends on your age and health. In general, for people age 21 years and older:   If you are younger than 50 years, you might be able to get a physical every 3 years.   If you are 50 years or older, your doctor might recommend a physical every year.  If you have an ongoing health condition, like diabetes or high blood pressure, your doctor will probably want to see you more often.  What happens during a physical? -- In general, each visit will include:   Physical exam - The doctor or nurse will check your height, weight, heart rate, and blood pressure. They will also look at your eyes and ears. They will ask about how you are feeling and whether you have any symptoms that bother you.   Medicines - It's a good idea to bring a list of all the medicines you take to each doctor visit. Your doctor will talk to you about your medicines and answer any questions. Tell them if you are having any side effects that bother you. You " "should also tell them if you are having trouble paying for any of your medicines.   Habits and behaviors - This includes:   Your diet   Your exercise habits   Whether you smoke, drink alcohol, or use drugs   Whether you are sexually active   Whether you feel safe at home  Your doctor will talk to you about things you can do to improve your health and lower your risk of health problems. They will also offer help and support. For example, if you want to quit smoking, they can give you advice and might prescribe medicines. If you want to improve your diet or get more physical activity, they can help you with this, too.   Lab tests, if needed - The tests you get will depend on your age and situation. For example, your doctor might want to check your:   Cholesterol   Blood sugar   Iron level   Vaccines - The recommended vaccines will depend on your age, health, and what vaccines you already had. Vaccines are very important because they can prevent certain serious or deadly infections.   Discussion of screening - \"Screening\" means checking for diseases or other health problems before they cause symptoms. Your doctor can recommend screening based on your age, risk, and preferences. This might include tests to check for:   Cancer, such as breast, prostate, cervical, ovarian, colorectal, prostate, lung, or skin cancer   Sexually transmitted infections, such as chlamydia and gonorrhea   Mental health conditions like depression and anxiety  Your doctor will talk to you about the different types of screening tests. They can help you decide which screenings to have. They can also explain what the results might mean.   Answering questions - The physical is a good time to ask the doctor or nurse questions about your health. If needed, they can refer you to other doctors or specialists, too.  Adults older than 65 years often need other care, too. As you get older, your doctor will talk to you about:   How to prevent falling at " home   Hearing or vision tests   Memory testing   How to take your medicines safely   Making sure that you have the help and support you need at home  All topics are updated as new evidence becomes available and our peer review process is complete.  This topic retrieved from [employer] on: May 02, 2024.  Topic 991271 Version 1.0  Release: 32.4.3 - C32.122  © 2024 UpToDate, Inc. and/or its affiliates. All rights reserved.  Consumer Information Use and Disclaimer   Disclaimer: This generalized information is a limited summary of diagnosis, treatment, and/or medication information. It is not meant to be comprehensive and should be used as a tool to help the user understand and/or assess potential diagnostic and treatment options. It does NOT include all information about conditions, treatments, medications, side effects, or risks that may apply to a specific patient. It is not intended to be medical advice or a substitute for the medical advice, diagnosis, or treatment of a health care provider based on the health care provider's examination and assessment of a patient's specific and unique circumstances. Patients must speak with a health care provider for complete information about their health, medical questions, and treatment options, including any risks or benefits regarding use of medications. This information does not endorse any treatments or medications as safe, effective, or approved for treating a specific patient. UpToDate, Inc. and its affiliates disclaim any warranty or liability relating to this information or the use thereof.The use of this information is governed by the Terms of Use, available at https://www.[employer] Cly[employer].com/en/know/clinical-effectiveness-terms. 2024© UpToDate, Inc. and its affiliates and/or licensors. All rights reserved.  Copyright   © 2024 UpToDate, Inc. and/or its affiliates. All rights reserved.

## 2024-11-29 NOTE — PROGRESS NOTES
Adult Annual Physical  Name: Claudia Smalls      : 1990      MRN: [id number]  Encounter Provider: Kinjal Faith DO  Encounter Date: 2024   Encounter department: [facility]    Assessment & Plan  Annual physical exam    Anticipatory guidance is discussed regarding preventative care.  Patient is up-to-date on Pap smear.  Declines screening for HIV and hepatitis C.  Declines COVID and flu vaccinations.  Is interested in potentially getting Vaccine requests that we check if this is gluten-free due to celiac's disease.  Recommend that patient schedule follow-up in 6 months for follow-up of chronic conditions or sooner as needed.         Subclinical hyperthyroidism    Recommend repeat thyroid labs in approximately 6 weeks to reassess subclinical hyperthyroidism.    Orders:    T3, free; Future    TSH+Free T4    Vitamin D deficiency    Patient with vitamin D deficiency noted on recent laboratory workup.   Vit D, 25-Hydroxy (ng/mL)   Date Value   2024 19.6 (L)      Patient has found a 10,000 IU supplement that is certified gluten-free.  Recommend taking this 5 times a week for 12 weeks followed by once weekly following this.  Recommend repeat vitamin D levels in approximately 6 months.  Encourage increased dietary vitamin D.    Orders:    Vitamin D 25 hydroxy; Future    Vitamin B12 deficiency    Patient declines IM therapy and prefers to trial oral therapy for this.  Recommend initiation of 1000 mcg B12 daily orally.  Repeat labs as requested by gastroenterology, encourage increased dietary B12.         Folate deficiency    Patient will initiate 800 mcg folate supplement and her certified gluten-free supplement.  Repeat labs as requested by gastroenterology, encourage increased dietary folate.           Immunizations and preventive care screenings were discussed with patient today. Appropriate education was printed on patient's after visit summary.    Counseling:  Alcohol/drug  use: discussed moderation in alcohol intake, the recommendations for healthy alcohol use, and avoidance of illicit drug use.  Dental Health: discussed importance of regular tooth brushing, flossing, and dental visits.  Injury prevention: discussed safety/seat belts, safety helmets, smoke detectors, carbon monoxide detectors, and smoking near bedding or upholstery.  Sexual health: discussed sexually transmitted diseases, partner selection, use of condoms, avoidance of unintended pregnancy, and contraceptive alternatives.  Exercise: the importance of regular exercise/physical activity was discussed. Recommend exercise 3-5 times per week for at least 30 minutes.          History of Present Illness     Adult Annual Physical:  Patient presents for annual physical.     Diet and Physical Activity:  - Diet/Nutrition: well balanced diet. limited fruit and vegetable intake, lots of animal protein and processed carbs  - Exercise: walking. lots of movement.    General Health:  - Sleep: sleeps poorly.  snores 6-7 hours  - Hearing: normal hearing right ear and normal hearing left ear.  - Vision: no vision problems.  - Dental: no dental visits for > 1 year.    /GYN Health:  - Follows with GYN: yes.   - Menopause: premenopausal.   - Last menstrual cycle: 10/29/2024.   - History of STDs: no  - Contraception: oral contraceptives.      Review of Systems   Constitutional:  Negative for fatigue and fever.   HENT:  Negative for congestion and sore throat.    Respiratory:  Negative for cough and shortness of breath.    Cardiovascular:  Negative for chest pain and palpitations.   Gastrointestinal:  Negative for abdominal pain, blood in stool, constipation, diarrhea, nausea and vomiting.   Genitourinary:  Negative for dysuria and hematuria.   Musculoskeletal:  Negative for arthralgias and myalgias.   Skin:  Negative for rash.   Neurological:  Negative for dizziness and headaches.   Psychiatric/Behavioral:  Negative for dysphoric  mood. The patient is not nervous/anxious.      Medical History Reviewed by provider this encounter:     .  Past Medical History   Past Medical History:   Diagnosis Date    Abnormal Pap smear of cervix     Celiac disease     Celiac disease     Dental abscess     HPV (human papilloma virus) anogenital infection     HPV (human papilloma virus) infection     Severe dysplasia of cervix (JOSE III)     Spontaneous      Spontaneous pneumothorax     right pneumothorax, treated with right VATS, bleb resection, apical pleurectomy 2012     Past Surgical History:   Procedure Laterality Date    CERVICAL BIOPSY N/A 2019    Procedure: BIOPSY CONE/COLD KNIFE CERVIX;  Surgeon: Dotty Ibrahim MD;  Location: [facility];  Service: Gynecology    CERVICAL BIOPSY  W/ LOOP ELECTRODE EXCISION  2018    COLPOSCOPY      DILATION AND CURETTAGE OF UTERUS      ESOPHAGOGASTRODUODENOSCOPY  2015    THORACOSCOPY      (Therapeutic) with pleurodesis     Family History   Problem Relation Age of Onset    Hypertension Mother     Hypothyroidism Father     Diabetes Brother     Breast cancer Maternal Grandmother     Liver cancer Maternal Grandfather     Hypertension Maternal Grandfather     Ovarian cancer Neg Hx     Colon cancer Neg Hx       reports that she has been smoking cigarettes. She has never used smokeless tobacco. She reports that she does not currently use alcohol. She reports that she does not currently use drugs.  Current Outpatient Medications on File Prior to Visit   Medication Sig Dispense Refill    famotidine (PEPCID) 20 mg tablet Take 1 tablet (20 mg total) by mouth daily Take 2 hours before bed nightly 30 tablet 4    folic acid ([employer] Folic Acid) 800 MCG tablet TAKE 1 TABLET (800 MCG TOTAL) BY MOUTH DAILY 90 tablet 1    ibuprofen (MOTRIN) 200 mg tablet Take 2 tablets (400 mg total) by mouth every 6 (six) hours as needed for mild pain 40 tablet 0    loratadine (CLARITIN) 10 mg tablet Take 10 mg by mouth daily       "Norethin-Eth Estradiol-Fe 0.8-25 MG-MCG CHEW Chew 1 tablet daily 90 tablet 4    Probiotic Product (PROBIOTIC DAILY PO) Take 1 capsule by mouth daily       No current facility-administered medications on file prior to visit.     Allergies   Allergen Reactions    Gluten Meal - Food Allergy       Current Outpatient Medications on File Prior to Visit   Medication Sig Dispense Refill    famotidine (PEPCID) 20 mg tablet Take 1 tablet (20 mg total) by mouth daily Take 2 hours before bed nightly 30 tablet 4    folic acid ([employer] Folic Acid) 800 MCG tablet TAKE 1 TABLET (800 MCG TOTAL) BY MOUTH DAILY 90 tablet 1    ibuprofen (MOTRIN) 200 mg tablet Take 2 tablets (400 mg total) by mouth every 6 (six) hours as needed for mild pain 40 tablet 0    loratadine (CLARITIN) 10 mg tablet Take 10 mg by mouth daily      Norethin-Eth Estradiol-Fe 0.8-25 MG-MCG CHEW Chew 1 tablet daily 90 tablet 4    Probiotic Product (PROBIOTIC DAILY PO) Take 1 capsule by mouth daily       No current facility-administered medications on file prior to visit.      Social History     Tobacco Use    Smoking status: Every Day     Current packs/day: 0.50     Types: Cigarettes    Smokeless tobacco: Never   Vaping Use    Vaping status: Never Used   Substance and Sexual Activity    Alcohol use: Not Currently    Drug use: Not Currently    Sexual activity: Yes     Partners: Male     Birth control/protection: Pill     Comment: contraceptives / oral contraceptives       Objective   /80 (BP Location: Right arm, Patient Position: Sitting)   Pulse 90   Temp 98.8 °F (37.1 °C)   Ht 5' 5.75\" (1.67 m)   Wt 55.9 kg (123 lb 3.2 oz)   SpO2 98%   BMI 20.04 kg/m²     Physical Exam  Vitals reviewed.   Constitutional:       General: She is not in acute distress.     Appearance: She is well-developed.   HENT:      Head: Normocephalic and atraumatic.      Right Ear: Tympanic membrane, ear canal and external ear normal.      Left Ear: Tympanic membrane, ear canal and " external ear normal.      Nose: Nose normal.      Mouth/Throat:      Mouth: Mucous membranes are moist.      Pharynx: Oropharynx is clear.   Eyes:      Conjunctiva/sclera: Conjunctivae normal.      Pupils: Pupils are equal, round, and reactive to light.   Cardiovascular:      Rate and Rhythm: Normal rate and regular rhythm.      Heart sounds: No murmur heard.  Pulmonary:      Effort: Pulmonary effort is normal. No respiratory distress.      Breath sounds: Normal breath sounds.   Abdominal:      Palpations: Abdomen is soft.      Tenderness: There is no abdominal tenderness.   Musculoskeletal:      Cervical back: Neck supple.      Right lower leg: No edema.      Left lower leg: No edema.   Lymphadenopathy:      Cervical: No cervical adenopathy.   Skin:     General: Skin is warm and dry.   Neurological:      General: No focal deficit present.      Mental Status: She is alert and oriented to person, place, and time.   Psychiatric:         Mood and Affect: Mood normal.         Behavior: Behavior normal.

## 2024-12-03 PROBLEM — E55.9 VITAMIN D DEFICIENCY: Status: ACTIVE | Noted: 2024-12-03

## 2024-12-03 PROBLEM — E53.8 FOLATE DEFICIENCY: Status: ACTIVE | Noted: 2024-12-03

## 2024-12-03 PROBLEM — E53.8 VITAMIN B12 DEFICIENCY: Status: ACTIVE | Noted: 2024-12-03

## 2024-12-03 NOTE — ASSESSMENT & PLAN NOTE
Patient will initiate 800 mcg folate supplement and her certified gluten-free supplement.  Repeat labs as requested by gastroenterology, encourage increased dietary folate.

## 2024-12-03 NOTE — ASSESSMENT & PLAN NOTE
Patient with vitamin D deficiency noted on recent laboratory workup.   Vit D, 25-Hydroxy (ng/mL)   Date Value   11/11/2024 19.6 (L)      Patient has found a 10,000 IU supplement that is certified gluten-free.  Recommend taking this 5 times a week for 12 weeks followed by once weekly following this.  Recommend repeat vitamin D levels in approximately 6 months.  Encourage increased dietary vitamin D.    Orders:    Vitamin D 25 hydroxy; Future

## 2024-12-03 NOTE — ASSESSMENT & PLAN NOTE
Patient declines IM therapy and prefers to trial oral therapy for this.  Recommend initiation of 1000 mcg B12 daily orally.  Repeat labs as requested by gastroenterology, encourage increased dietary B12.

## 2024-12-09 ENCOUNTER — TELEPHONE (OUTPATIENT)
Age: 34
End: 2024-12-09

## 2024-12-09 NOTE — TELEPHONE ENCOUNTER
Patient calling in stating that she has a colposcopy on 1/3/24, pt stating that she will still have her period on that day and wanted to know if she needs to move the appt.

## 2024-12-09 NOTE — TELEPHONE ENCOUNTER
Outgoing call to patient. Informed of providers response. Patient verbalized understanding.     Patient states she will keep appointment as scheduled as this will be at the end of her period and bleeding if any will be very light.

## 2024-12-18 ENCOUNTER — TELEPHONE (OUTPATIENT)
Age: 34
End: 2024-12-18

## 2024-12-18 NOTE — TELEPHONE ENCOUNTER
Pt calling in, reports she went to see ENT and advised she has silent GERD. Pt asking if she would still need to have EGD done or if she can just follow up at next office visit in March. I advised would defer to provider.     Pt had recent celiac panel which was normal.

## 2024-12-18 NOTE — TELEPHONE ENCOUNTER
Left message on patient voicemail to call back for recommendations regarding EGD.   Per Dr. Max Basurto, please let Claudia know that yes, patients with silent GERD or laryngopharyngeal reflux disease should undergo endoscopy to be sure that there is been no damage to the esophagus or precancerous changes such as Rose's esophagus.  This is an indication for upper endoscopy.  Did she ever pursue the video swallow or esophagram?  I do not see the results.  If she is still having difficulty swallowing?  Thank you

## 2024-12-18 NOTE — TELEPHONE ENCOUNTER
Pt returned missed call. 's message was reviewed with pt. Pt reports she has not completed a video swallow/esophagram. She states she experiences intermittent trouble when swallowing however this is not as bad as it once was. Pt is agreeable to move forward with EGD to evaluate her silent GERD. This is scheduled for 02/19. Pt is without further questions.

## 2025-01-02 ENCOUNTER — TELEPHONE (OUTPATIENT)
Age: 35
End: 2025-01-02

## 2025-01-02 NOTE — TELEPHONE ENCOUNTER
Patient is unable to take another day off since Colposcopy, scheduled for 1/3/25, has been cancelled. She would have to schedule colposcopy months out again to coordinate with her work schedule and would like to know if she can have a pap smear done instead to see if anything is different rather than schedule the colposcopy procedure. Contact patient with recommendations.

## 2025-01-03 ENCOUNTER — PROCEDURE VISIT (OUTPATIENT)
Dept: OBGYN CLINIC | Facility: CLINIC | Age: 35
End: 2025-01-03
Payer: COMMERCIAL

## 2025-01-03 VITALS
HEIGHT: 66 IN | WEIGHT: 126.8 LBS | SYSTOLIC BLOOD PRESSURE: 142 MMHG | DIASTOLIC BLOOD PRESSURE: 78 MMHG | BODY MASS INDEX: 20.38 KG/M2

## 2025-01-03 DIAGNOSIS — D06.9 SEVERE DYSPLASIA OF CERVIX (CIN III): Primary | ICD-10-CM

## 2025-01-03 DIAGNOSIS — Z01.812 PRE-PROCEDURE LAB EXAM: ICD-10-CM

## 2025-01-03 LAB
SL AMB POCT URINE HCG: NEGATIVE
SL AMB POCT URINE HCG: NORMAL

## 2025-01-03 PROCEDURE — 81025 URINE PREGNANCY TEST: CPT | Performed by: OBSTETRICS & GYNECOLOGY

## 2025-01-03 PROCEDURE — 57455 BIOPSY OF CERVIX W/SCOPE: CPT | Performed by: OBSTETRICS & GYNECOLOGY

## 2025-01-03 PROCEDURE — 88305 TISSUE EXAM BY PATHOLOGIST: CPT | Performed by: PATHOLOGY

## 2025-01-03 PROCEDURE — 88342 IMHCHEM/IMCYTCHM 1ST ANTB: CPT | Performed by: PATHOLOGY

## 2025-01-03 NOTE — PROGRESS NOTES
Colposcopy    Date/Time: 1/3/2025 1:30 PM    Performed by: Eveline Lara MD  Authorized by: Eveline Lara MD    Verbal consent obtained?: Yes    Written consent obtained?: Yes    Risks and benefits: Risks, benefits and alternatives were discussed    Consent given by:  Patient  Patient states understanding of procedure being performed: Yes    Patient's understanding of procedure matches consent: Yes    Procedure consent matches procedure scheduled: Yes    Relevant documents present and verified: Yes    Test results available and properly labeled: Yes    Required items: Required blood products, implants, devices and special equipment available    Pre-procedure:     Prepped with: acetic acid    Indication:     Indication:  ASC-US  Procedure:     Procedure: Colposcopy w/ biopsy of cervix      Under satisfactory analgesia the patient was prepped and draped in the dorsal lithotomy position: yes      [place] speculum was placed in the vagina: yes      Under colposcopic examination the transition zone was seen in entirety: yes      Cervical biopsy performed with a cervical biopsy punch: yes (11 and 2 o clock)      Monsel's solution was applied: yes      Specimen(s) to pathology: yes    Post-procedure:     Findings: Mosaicism and White epithelium      Impression: Low grade cervical dysplasia      Patient tolerance of procedure:  Tolerated well, no immediate complications      Results for orders placed or performed in visit on 01/03/25   POCT urine HCG    Collection Time: 01/03/25  1:56 PM   Result Value Ref Range    URINE HCG neg        Severe dysplasia of cervix (JOSE III)  Patient with ASCUS/HPV neg pap in August 2024 presenting today for colposcopy  Pt with 2 prior conization procedures  Impression today low grade changes. Will f/u based on results. Precautions reviewed

## 2025-01-03 NOTE — ASSESSMENT & PLAN NOTE
Patient with ASCUS/HPV neg pap in August 2024 presenting today for colposcopy  Pt with 2 prior conization procedures  Impression today low grade changes. Will f/u based on results. Precautions reviewed

## 2025-01-03 NOTE — TELEPHONE ENCOUNTER
Called pt to inform her that there is a recent opening in Dr. Lara's schedule at 1:30. Advised pt to call back to confirm if this time works for her.

## 2025-01-08 ENCOUNTER — RESULTS FOLLOW-UP (OUTPATIENT)
Dept: OBGYN CLINIC | Facility: CLINIC | Age: 35
End: 2025-01-08

## 2025-01-08 PROCEDURE — 88305 TISSUE EXAM BY PATHOLOGIST: CPT | Performed by: PATHOLOGY

## 2025-01-08 PROCEDURE — 88342 IMHCHEM/IMCYTCHM 1ST ANTB: CPT | Performed by: PATHOLOGY

## 2025-01-16 ENCOUNTER — TELEPHONE (OUTPATIENT)
Age: 35
End: 2025-01-16

## 2025-01-16 NOTE — TELEPHONE ENCOUNTER
Scheduled date of EGD(as of today):02/14/2025  Physician performing EGD:Dr Iraheta  Location of EGD:[facility]  Instructions reviewed with patient by: pt has instructions   Clearances: n/a    FYI- pt is ophelia Guillen but she requested a Friday since she is off from work but she will continue f/u with DR Guillen thank you

## 2025-02-14 ENCOUNTER — ANESTHESIA EVENT (OUTPATIENT)
Dept: GASTROENTEROLOGY | Facility: HOSPITAL | Age: 35
End: 2025-02-14
Payer: COMMERCIAL

## 2025-02-14 ENCOUNTER — HOSPITAL ENCOUNTER (OUTPATIENT)
Dept: GASTROENTEROLOGY | Facility: HOSPITAL | Age: 35
Setting detail: OUTPATIENT SURGERY
End: 2025-02-14
Attending: INTERNAL MEDICINE
Payer: COMMERCIAL

## 2025-02-14 ENCOUNTER — ANESTHESIA (OUTPATIENT)
Dept: GASTROENTEROLOGY | Facility: HOSPITAL | Age: 35
End: 2025-02-14
Payer: COMMERCIAL

## 2025-02-14 VITALS
SYSTOLIC BLOOD PRESSURE: 101 MMHG | DIASTOLIC BLOOD PRESSURE: 59 MMHG | HEART RATE: 97 BPM | OXYGEN SATURATION: 98 % | TEMPERATURE: 97.9 F | RESPIRATION RATE: 18 BRPM

## 2025-02-14 DIAGNOSIS — R13.12 OROPHARYNGEAL DYSPHAGIA: ICD-10-CM

## 2025-02-14 LAB
EXT PREGNANCY TEST URINE: NEGATIVE
EXT. CONTROL: NORMAL

## 2025-02-14 PROCEDURE — 81025 URINE PREGNANCY TEST: CPT | Performed by: STUDENT IN AN ORGANIZED HEALTH CARE EDUCATION/TRAINING PROGRAM

## 2025-02-14 PROCEDURE — 88305 TISSUE EXAM BY PATHOLOGIST: CPT | Performed by: PATHOLOGY

## 2025-02-14 PROCEDURE — 43239 EGD BIOPSY SINGLE/MULTIPLE: CPT | Performed by: STUDENT IN AN ORGANIZED HEALTH CARE EDUCATION/TRAINING PROGRAM

## 2025-02-14 RX ORDER — PROPOFOL 10 MG/ML
INJECTION, EMULSION INTRAVENOUS AS NEEDED
Status: DISCONTINUED | OUTPATIENT
Start: 2025-02-14 | End: 2025-02-14

## 2025-02-14 RX ORDER — SODIUM CHLORIDE, SODIUM LACTATE, POTASSIUM CHLORIDE, CALCIUM CHLORIDE 600; 310; 30; 20 MG/100ML; MG/100ML; MG/100ML; MG/100ML
125 INJECTION, SOLUTION INTRAVENOUS CONTINUOUS
Status: DISCONTINUED | OUTPATIENT
Start: 2025-02-14 | End: 2025-02-18 | Stop reason: HOSPADM

## 2025-02-14 RX ADMIN — SODIUM CHLORIDE, SODIUM LACTATE, POTASSIUM CHLORIDE, AND CALCIUM CHLORIDE 125 ML/HR: .6; .31; .03; .02 INJECTION, SOLUTION INTRAVENOUS at 08:43

## 2025-02-14 RX ADMIN — PROPOFOL 150 MG: 10 INJECTION, EMULSION INTRAVENOUS at 08:55

## 2025-02-14 RX ADMIN — PROPOFOL 50 MG: 10 INJECTION, EMULSION INTRAVENOUS at 08:59

## 2025-02-14 RX ADMIN — PROPOFOL 50 MG: 10 INJECTION, EMULSION INTRAVENOUS at 09:03

## 2025-02-14 NOTE — ANESTHESIA POSTPROCEDURE EVALUATION
Post-Op Assessment Note    Last Filed PACU Vitals:  Vitals Value Taken Time   Temp     Pulse 88    /62    Resp 12    SpO2 99

## 2025-02-14 NOTE — H&P
[facility]  History & Physical     PATIENT INFO     Name: Claudia Smalls  YOB: 1990   Age: 34 y.o.   Sex: female   MRN: [id number]     HISTORY OF PRESENT ILLNESS     Claudia Smalls is a 34 y.o. year old female who presents for EGD for oropharyngeal dysphagia.  No antithrombotics or anticoagulants.     REVIEW OF SYSTEMS     Per the HPI, and otherwise unremarkable.    Historical Information   Past Medical History:   Diagnosis Date    Abnormal Pap smear of cervix     Celiac disease     Celiac disease     Dental abscess     HPV (human papilloma virus) anogenital infection     HPV (human papilloma virus) infection     Severe dysplasia of cervix (JOSE III)     Spontaneous      Spontaneous pneumothorax     right pneumothorax, treated with right VATS, bleb resection, apical pleurectomy 2012     Past Surgical History:   Procedure Laterality Date    CERVICAL BIOPSY N/A 2019    Procedure: BIOPSY CONE/COLD KNIFE CERVIX;  Surgeon: Dotty Ibrahim MD;  Location: [facility];  Service: Gynecology    CERVICAL BIOPSY  W/ LOOP ELECTRODE EXCISION      COLPOSCOPY      DILATION AND CURETTAGE OF UTERUS      ESOPHAGOGASTRODUODENOSCOPY  2015    THORACOSCOPY      (Therapeutic) with pleurodesis     Social History   Social History     Substance and Sexual Activity   Alcohol Use Not Currently     Social History     Substance and Sexual Activity   Drug Use Not Currently     Social History     Tobacco Use   Smoking Status Every Day    Current packs/day: 0.50    Types: Cigarettes   Smokeless Tobacco Never     Family History   Problem Relation Age of Onset    Hypertension Mother     Hypothyroidism Father     Diabetes Brother     Breast cancer Maternal Grandmother     Liver cancer Maternal Grandfather     Hypertension Maternal Grandfather     Ovarian cancer Neg Hx     Colon cancer Neg Hx         MEDICATIONS & ALLERGIES     Current Outpatient Medications    Medication Instructions    famotidine (PEPCID) 20 mg, Oral, Daily, Take 2 hours before bed nightly    folic acid ([employer] Folic Acid) 800 MCG tablet TAKE 1 TABLET (800 MCG TOTAL) BY MOUTH DAILY    ibuprofen (MOTRIN) 400 mg, Oral, Every 6 hours PRN    loratadine (CLARITIN) 10 mg, Daily    Norethin-Eth Estradiol-Fe 0.8-25 MG-MCG CHEW 1 tablet, Oral, Daily    Probiotic Product (PROBIOTIC DAILY PO) 1 capsule, Daily     Allergies   Allergen Reactions    Gluten Meal - Food Allergy         PHYSICAL EXAM      Objective   Blood pressure 132/84, pulse (!) 109, temperature 99.8 °F (37.7 °C), temperature source Temporal, resp. rate 18, last menstrual period 01/27/2025, SpO2 98%. There is no height or weight on file to calculate BMI.    General Appearance:   Alert, cooperative, no distress   Lungs:   Equal chest rise, respirations unlabored    Heart:   Regular rate and rhythm   Abdomen:   Soft, non-tender, non-distended; normal bowel sounds; no masses, no organomegaly    Extremities:   No edema       ASSESSMENT & PLAN     This is a 34 y.o. year old female here for EGD, and she is stable and optimized for her procedure.      Mickey Iraheta D.O.  [facility]  Division of Gastroenterology & Hepatology  Available on [employer]  [email or web address]    ** Please Note: This note is constructed using a voice recognition dictation system. **

## 2025-02-14 NOTE — ANESTHESIA PREPROCEDURE EVALUATION
Procedure:  EGD    Relevant Problems   GI/HEPATIC   (+) GERD (gastroesophageal reflux disease)   (+) Oropharyngeal dysphagia      NEURO/PSYCH   (+) Cervicogenic headache      Obstetrics/Gynecology   (+) Severe dysplasia of cervix (JOSE III)      FEN/Gastrointestinal   (+) Celiac disease      Other   (+) Folate deficiency   (+) Vitamin B12 deficiency   (+) Vitamin D deficiency        Physical Exam    Airway    Mallampati score: II  TM Distance: >3 FB  Neck ROM: full     Dental   No notable dental hx     Cardiovascular  Cardiovascular exam normal    Pulmonary  Pulmonary exam normal     Other Findings  post-pubertal.      Anesthesia Plan  ASA Score- 2     Anesthesia Type- IV sedation with anesthesia with ASA Monitors.         Additional Monitors:     Airway Plan:            Plan Factors-Exercise tolerance (METS): >4 METS.    Chart reviewed. EKG reviewed. Imaging results reviewed. Existing labs reviewed. Patient summary reviewed.    Patient is a current smoker.  Patient instructed to abstain from smoking on day of procedure. Patient smoked on day of surgery.            Induction- intravenous.    Postoperative Plan-         Informed Consent- Anesthetic plan and risks discussed with patient.  I personally reviewed this patient with the CRNA. Discussed and agreed on the Anesthesia Plan with the CRNA..      NPO Status:  No vitals data found for the desired time range.

## 2025-02-17 PROCEDURE — 88305 TISSUE EXAM BY PATHOLOGIST: CPT | Performed by: PATHOLOGY

## 2025-03-06 ENCOUNTER — TELEPHONE (OUTPATIENT)
Age: 35
End: 2025-03-06

## 2025-03-06 NOTE — TELEPHONE ENCOUNTER
Patient called in regards to her Vitamin D, patient would like to confirm that she is taking it correctly. Patient would like to confirm the break down on how she is supposed to be taking medication.

## 2025-03-21 ENCOUNTER — DOCUMENTATION (OUTPATIENT)
Dept: GASTROENTEROLOGY | Facility: CLINIC | Age: 35
End: 2025-03-21

## 2025-03-21 NOTE — PROGRESS NOTES
Records reviewed and outlined below in preparation for office visit 3/20 4/2025;    She has a great aunt with celiac disease.  2 uncles have celiac disease  A cousin has celiac disease  There is no family history colon cancer, colon polyps, or esophageal cancer  A brother and cousin have type 1 diabetes     She does smoke about 1/2 to 3/4 pack of cigarettes per day.  She does not drink alcohol.    2/14/2025 EGD Dr. Iraheta done for dysphagia  2 cm sliding hiatal hernia  Small tear in the distal esophagus/GE junction possibly from scope trauma.  Clip placed for hemostasis   The upper third and middle third of the esophagus appeared normal.  Z-line at 36 cm.  Biopsy revealed chronic inflammation mainly lymphocytes and reactive changes.  No increased eosinophils  Normal-appearing stomach.  Biopsy revealed chronic inactive gastritis.  Negative for H. pylori.  Duodenum appeared normal.  Biopsy revealed normal small bowel mucosa.    12/11/2024 ENT consultation  Laryngopharyngeal reflux disease  Tongue disorder  Pharyngoesophageal dysphagia  Tobacco user  ENT felt that she may have silent reflux.  Encouraged that she quit smoking.  Encouraged her to take famotidine    11/11/2024 laboratory data  Sodium 138  Potassium 4.1  BUN 13  Creatinine 0.57  AST 15  ALT 9  Alk phos 42  T. bili 0.55  Celiac antibodies all negative suggesting no gluten exposure  Iron 164  Ferritin 59  Iron saturation 42%  TIBC 227  Folate 2.1  Intrinsic factor 1.1  Vitamin D 19.6  Vitamin B12 124  WC 5.86 Hgb 13 HCT 37.3   Platelet count 266,000  TSH 0.432  Free T4  0.61     1/21/2016 EGD Dr. Nemesio Cruz  Normal esophagus  Normal stomach  Normal duodenum  Biopsy duodenal bulb and second portion revealed partially developed sprue like changes  Reportedly positive TTG IgA and endomysial antibody did not have lab results

## 2025-03-24 ENCOUNTER — APPOINTMENT (OUTPATIENT)
Dept: LAB | Facility: CLINIC | Age: 35
End: 2025-03-24
Payer: COMMERCIAL

## 2025-03-24 ENCOUNTER — OFFICE VISIT (OUTPATIENT)
Dept: GASTROENTEROLOGY | Facility: CLINIC | Age: 35
End: 2025-03-24
Payer: COMMERCIAL

## 2025-03-24 VITALS
BODY MASS INDEX: 20.83 KG/M2 | HEIGHT: 66 IN | OXYGEN SATURATION: 98 % | HEART RATE: 103 BPM | TEMPERATURE: 97.9 F | RESPIRATION RATE: 16 BRPM | SYSTOLIC BLOOD PRESSURE: 124 MMHG | DIASTOLIC BLOOD PRESSURE: 70 MMHG | WEIGHT: 129.6 LBS

## 2025-03-24 DIAGNOSIS — R13.12 OROPHARYNGEAL DYSPHAGIA: ICD-10-CM

## 2025-03-24 DIAGNOSIS — K90.0 CELIAC DISEASE: ICD-10-CM

## 2025-03-24 DIAGNOSIS — E53.8 FOLATE DEFICIENCY: ICD-10-CM

## 2025-03-24 DIAGNOSIS — K21.9 GASTROESOPHAGEAL REFLUX DISEASE WITHOUT ESOPHAGITIS: Primary | ICD-10-CM

## 2025-03-24 DIAGNOSIS — E53.8 VITAMIN B12 DEFICIENCY: ICD-10-CM

## 2025-03-24 LAB
ERYTHROCYTE [DISTWIDTH] IN BLOOD BY AUTOMATED COUNT: 12.3 % (ref 11.6–15.1)
FOLATE SERPL-MCNC: 17 NG/ML
HCT VFR BLD AUTO: 44.6 % (ref 34.8–46.1)
HGB BLD-MCNC: 14.6 G/DL (ref 11.5–15.4)
MCH RBC QN AUTO: 30.9 PG (ref 26.8–34.3)
MCHC RBC AUTO-ENTMCNC: 32.7 G/DL (ref 31.4–37.4)
MCV RBC AUTO: 94 FL (ref 82–98)
PLATELET # BLD AUTO: 325 THOUSANDS/UL (ref 149–390)
PMV BLD AUTO: 9.3 FL (ref 8.9–12.7)
RBC # BLD AUTO: 4.73 MILLION/UL (ref 3.81–5.12)
VIT B12 SERPL-MCNC: 520 PG/ML (ref 180–914)
WBC # BLD AUTO: 7.51 THOUSAND/UL (ref 4.31–10.16)

## 2025-03-24 PROCEDURE — 36415 COLL VENOUS BLD VENIPUNCTURE: CPT

## 2025-03-24 PROCEDURE — 99214 OFFICE O/P EST MOD 30 MIN: CPT | Performed by: INTERNAL MEDICINE

## 2025-03-24 PROCEDURE — 82746 ASSAY OF FOLIC ACID SERUM: CPT

## 2025-03-24 PROCEDURE — 85027 COMPLETE CBC AUTOMATED: CPT

## 2025-03-24 PROCEDURE — 82607 VITAMIN B-12: CPT

## 2025-03-24 PROCEDURE — 82306 VITAMIN D 25 HYDROXY: CPT

## 2025-03-24 NOTE — ASSESSMENT & PLAN NOTE
Patient was noted to have a low vitamin B12 level.  She is currently receiving 1000 mcg orally daily.  She did not receive any IM shots.  She was unable to get sublingual formula as she has to take a certified gluten free formula.  The sublingual formula is better absorbed.  Check vitamin B12 level.  Of note her intrinsic factor antibody and parietal cell antibodies were negative  Orders:  •  Vitamin B12; Future

## 2025-03-24 NOTE — ASSESSMENT & PLAN NOTE
Check folate level.  I suspect she may be able to decrease her folic acid supplementation to 400 mcg daily.  She is currently using 800 mcg daily.  Orders:  •  Folate; Future

## 2025-03-24 NOTE — ASSESSMENT & PLAN NOTE
Claudia reports that her difficulty swallowing has significantly improved.  If symptoms recur I would recommend considering esophagram with barium tablet in addition to videofluoroscopic swallow examination with speech therapy.  Sounds more like an oropharyngeal form of dysphagia rather than esophageal form of dysphagia.  There is nothing to explain an esophageal cause for dysphagia at time of her EGD.  In addition biopsy of the distal esophagus was negative for eosinophils.

## 2025-03-24 NOTE — ASSESSMENT & PLAN NOTE
Claudia was diagnosed with celiac disease based upon laboratory test and small bowel biopsy on January 21, 2016.  She does follow a strict gluten-free diet.  She did have celiac antibodies obtained in November which were negative suggesting no sign of gluten exposure.  Unfortunately she was deficient in vitamin D, vitamin B12, and folate.  She has been replacing these vitamins.  Check vitamin B12 level  Check folate  Continue vitamin B12 and folate supplementation.  Continue vitamin D supplementation direction of her primary care provider.  Continue gluten-free diet  Check thyroid function yearly as these patients are prone to other autoimmune disorders including thyroid disorders.  Orders:  •  Vitamin D 25 hydroxy; Future

## 2025-03-24 NOTE — PATIENT INSTRUCTIONS
GERD (gastroesophageal reflux disease)  Claudia likely has mild reflux.  She has been managing this through dietary modification.  Continue with dietary modification for her mild reflux.  If she has worsening symptoms that do not respond to dietary measures then consider the addition of a proton pump inhibitor or an H2 blocker.    Celiac disease  Claudia was diagnosed with celiac disease based upon laboratory test and small bowel biopsy on January 21, 2016.  She does follow a strict gluten-free diet.  She did have celiac antibodies obtained in November which were negative suggesting no sign of gluten exposure.  Unfortunately she was deficient in vitamin D, vitamin B12, and folate.  She has been replacing these vitamins.  Check vitamin B12 level  Check folate  Continue vitamin B12 and folate supplementation.  Continue vitamin D supplementation direction of her primary care provider.  Continue gluten-free diet  Check thyroid function yearly as these patients are prone to other autoimmune disorders including thyroid disorders.    Oropharyngeal dysphagia  Claudia reports that her difficulty swallowing has significantly improved.  If symptoms recur I would recommend considering esophagram with barium tablet in addition to videofluoroscopic swallow examination with speech therapy.  Sounds more like an oropharyngeal form of dysphagia rather than esophageal form of dysphagia.  There is nothing to explain an esophageal cause for dysphagia at time of her EGD.  In addition biopsy of the distal esophagus was negative for eosinophils.    Vitamin B12 deficiency  Patient was noted to have a low vitamin B12 level.  She is currently receiving 1000 mcg orally daily.  She did not receive any IM shots.  She was unable to get sublingual formula as she has to take a certified gluten free formula.  The sublingual formula is better absorbed.  Check vitamin B12 level.  Of note her intrinsic factor antibody and parietal cell antibodies  were negative    Folate deficiency  Check folate level.  I suspect she may be able to decrease her folic acid supplementation to 400 mcg daily.  She is currently using 800 mcg daily.

## 2025-03-24 NOTE — PROGRESS NOTES
Name: Claudia Smalls      : 1990      MRN: [id number]  Encounter Provider: Anderson Santana DO  Encounter Date: 3/24/2025   Encounter department: [facility] [place]  :  Assessment & Plan  Gastroesophageal reflux disease without esophagitis  Claudia likely has mild reflux.  She has been managing this through dietary modification.  Continue with dietary modification for her mild reflux.  If she has worsening symptoms that do not respond to dietary measures then consider the addition of a proton pump inhibitor or an H2 blocker.       Celiac disease  Claudia was diagnosed with celiac disease based upon laboratory test and small bowel biopsy on 2016.  She does follow a strict gluten-free diet.  She did have celiac antibodies obtained in November which were negative suggesting no sign of gluten exposure.  Unfortunately she was deficient in vitamin D, vitamin B12, and folate.  She has been replacing these vitamins.  Check vitamin B12 level  Check folate  Continue vitamin B12 and folate supplementation.  Continue vitamin D supplementation direction of her primary care provider.  Continue gluten-free diet  Check thyroid function yearly as these patients are prone to other autoimmune disorders including thyroid disorders.  Orders:  •  Vitamin D 25 hydroxy; Future    Oropharyngeal dysphagia  Claudia reports that her difficulty swallowing has significantly improved.  If symptoms recur I would recommend considering esophagram with barium tablet in addition to videofluoroscopic swallow examination with speech therapy.  Sounds more like an oropharyngeal form of dysphagia rather than esophageal form of dysphagia.  There is nothing to explain an esophageal cause for dysphagia at time of her EGD.  In addition biopsy of the distal esophagus was negative for eosinophils.       Vitamin B12 deficiency  Patient was noted to have a low vitamin B12 level.  She is currently  receiving 1000 mcg orally daily.  She did not receive any IM shots.  She was unable to get sublingual formula as she has to take a certified gluten free formula.  The sublingual formula is better absorbed.  Check vitamin B12 level.  Of note her intrinsic factor antibody and parietal cell antibodies were negative  Orders:  •  Vitamin B12; Future    Folate deficiency  Check folate level.  I suspect she may be able to decrease her folic acid supplementation to 400 mcg daily.  She is currently using 800 mcg daily.  Orders:  •  Folate; Future        History of Present Illness   HPI  Claudia Smalls is a 34 y.o. female who presents for follow-up of dysphagia celiac disease and probable GERD.  She was also noted to have folate and vitamin B12 deficiency.  She was avoiding greens and other folate rich foods as they tend to upset her GI tract.  She only eats foods that are certified gluten-free for her celiac disease as she is ultrasensitive to gluten.    She reports she is having much less dysphagia.  The main time that she feels like she is having problems swallowing is when she is brushing her teeth as she is leaning forward and more bent over.  But there is really been no dysphagia to drinking liquids or eating solid foods.    She feels like she has been gaining weight since going on vitamin D and replacing vitamins.  She has been trying to gain weight for a while and finally she has been able to gain some weight.    She denies any heartburn or acid reflux symptoms.  She did see ENT.  She states her ENT doctor felt that she could be having silent reflux.  She is not currently using any acid lowering medication but she is made a variety of changes in her diet and timing of her meals and she feels this is provided significant improvement.    Bowel habits are on a daily basis and no change in the pattern.  No rectal bleeding or black stools.    Records reviewed and outlined below.       She has a great aunt with celiac  disease.  2 uncles have celiac disease  A cousin has celiac disease  There is no family history colon cancer, colon polyps, or esophageal cancer  A brother and cousin have type 1 diabetes     She does smoke about 1/2 to 3/4 pack of cigarettes per day.  She does not drink alcohol.     2/14/2025 EGD Dr. Iraheta done for dysphagia  2 cm sliding hiatal hernia  Small tear in the distal esophagus/GE junction possibly from scope trauma.  Clip placed for hemostasis   The upper third and middle third of the esophagus appeared normal.  Z-line at 36 cm.  Biopsy revealed chronic inflammation mainly lymphocytes and reactive changes.  No increased eosinophils  Normal-appearing stomach.  Biopsy revealed chronic inactive gastritis.  Negative for H. pylori.  Duodenum appeared normal.  Biopsy revealed normal small bowel mucosa.     12/11/2024 ENT consultation  Laryngopharyngeal reflux disease  Tongue disorder  Pharyngoesophageal dysphagia  Tobacco user  ENT felt that she may have silent reflux.  Encouraged that she quit smoking.  Encouraged her to take famotidine     11/11/2024 laboratory data  Sodium 138  Potassium 4.1  BUN 13  Creatinine 0.57  AST 15  ALT 9  Alk phos 42  T. bili 0.55  Celiac antibodies all negative suggesting no gluten exposure  Iron 164  Ferritin 59  Iron saturation 42%  TIBC 227  Folate 2.1  Intrinsic factor 1.1  Vitamin D 19.6  Vitamin B12 124  WC 5.86 Hgb 13 HCT 37.3   Platelet count 266,000  TSH 0.432  Free T4  0.61     1/21/2016 EGD Dr. Nemesio Cruz  Normal esophagus  Normal stomach  Normal duodenum  Biopsy duodenal bulb and second portion revealed partially developed sprue like changes  Reportedly positive TTG IgA and endomysial antibody did not have lab results      Review of Systems  Past Medical History   Past Medical History:   Diagnosis Date   • Abnormal Pap smear of cervix    • Celiac disease    • Celiac disease    • Dental abscess    • HPV (human papilloma virus) anogenital infection    • HPV  (human papilloma virus) infection    • Severe dysplasia of cervix (JOSE III)    • Spontaneous     • Spontaneous pneumothorax     right pneumothorax, treated with right VATS, bleb resection, apical pleurectomy 2012     Past Surgical History:   Procedure Laterality Date   • CERVICAL BIOPSY N/A 2019    Procedure: BIOPSY CONE/COLD KNIFE CERVIX;  Surgeon: Dotty Ibrahim MD;  Location: [facility];  Service: Gynecology   • CERVICAL BIOPSY  W/ LOOP ELECTRODE EXCISION     • COLPOSCOPY     • DILATION AND CURETTAGE OF UTERUS     • ESOPHAGOGASTRODUODENOSCOPY  2015   • THORACOSCOPY      (Therapeutic) with pleurodesis     Family History   Problem Relation Age of Onset   • Hypertension Mother    • Hypothyroidism Father    • Diabetes Brother    • Breast cancer Maternal Grandmother    • Liver cancer Maternal Grandfather    • Hypertension Maternal Grandfather    • Ovarian cancer Neg Hx    • Colon cancer Neg Hx       reports that she has been smoking cigarettes. She has never used smokeless tobacco. She reports that she does not currently use alcohol. She reports that she does not currently use drugs.  Current Outpatient Medications   Medication Instructions   • famotidine (PEPCID) 20 mg, Oral, Daily, Take 2 hours before bed nightly   • folic acid ([employer]S Folic Acid) 800 MCG tablet TAKE 1 TABLET (800 MCG TOTAL) BY MOUTH DAILY   • ibuprofen (MOTRIN) 400 mg, Oral, Every 6 hours PRN   • loratadine (CLARITIN) 10 mg, Daily   • Norethin-Eth Estradiol-Fe 0.8-25 MG-MCG CHEW 1 tablet, Oral, Daily   • Probiotic Product (PROBIOTIC DAILY PO) 1 capsule, Daily     Allergies   Allergen Reactions   • Gluten Meal - Food Allergy       Current Outpatient Medications on File Prior to Visit   Medication Sig Dispense Refill   • famotidine (PEPCID) 20 mg tablet Take 1 tablet (20 mg total) by mouth daily Take 2 hours before bed nightly 30 tablet 4   • folic acid ([employer]S Folic Acid) 800 MCG tablet TAKE 1 TABLET (800 MCG TOTAL) BY  "MOUTH DAILY 90 tablet 1   • loratadine (CLARITIN) 10 mg tablet Take 10 mg by mouth daily     • Norethin-Eth Estradiol-Fe 0.8-25 MG-MCG CHEW Chew 1 tablet daily 90 tablet 4   • Probiotic Product (PROBIOTIC DAILY PO) Take 1 capsule by mouth daily     • ibuprofen (MOTRIN) 200 mg tablet Take 2 tablets (400 mg total) by mouth every 6 (six) hours as needed for mild pain (Patient not taking: Reported on 3/24/2025) 40 tablet 0     No current facility-administered medications on file prior to visit.      Social History     Tobacco Use   • Smoking status: Every Day     Current packs/day: 0.50     Types: Cigarettes   • Smokeless tobacco: Never   Vaping Use   • Vaping status: Never Used   Substance and Sexual Activity   • Alcohol use: Not Currently   • Drug use: Not Currently   • Sexual activity: Yes     Partners: Male     Birth control/protection: Pill     Comment: contraceptives / oral contraceptives        Objective   /70   Pulse 103   Temp 97.9 °F (36.6 °C)   Resp 16   Ht 5' 5.75\" (1.67 m)   Wt 58.8 kg (129 lb 9.6 oz)   SpO2 98%   BMI 21.08 kg/m²      Physical Exam    General Appearance: Alert, cooperative, no distress  HEENT: Normocephalic, atraumatic, anicteric.   Neck: Supple, symmetrical, trachea midline  Lungs: Clear to auscultation bilaterally; no rales, rhonchi or wheezing; respirations unlabored   Heart: Regular rate and rhythm; no murmur, rub, or gallop.  Mildly tachycardic 102 beats a minute  Abdomen: Soft, bowel sounds normal, non-tender, non-distended; no masses, there is no hepatosplenomegaly. No spider angiomas   Genitalia: Deferred   Rectal: Deferred   Extremities: No cyanosis, clubbing or edema   Skin: No jaundice, rashes, or lesions   Lymph nodes: No palpable cervical lymphadenopathy         "

## 2025-03-24 NOTE — ASSESSMENT & PLAN NOTE
Claudia likely has mild reflux.  She has been managing this through dietary modification.  Continue with dietary modification for her mild reflux.  If she has worsening symptoms that do not respond to dietary measures then consider the addition of a proton pump inhibitor or an H2 blocker.

## 2025-03-25 LAB — 25(OH)D3 SERPL-MCNC: 49.5 NG/ML (ref 30–100)

## 2025-06-09 ENCOUNTER — OFFICE VISIT (OUTPATIENT)
Dept: FAMILY MEDICINE CLINIC | Facility: CLINIC | Age: 35
End: 2025-06-09
Payer: COMMERCIAL

## 2025-06-09 VITALS
HEIGHT: 66 IN | OXYGEN SATURATION: 98 % | WEIGHT: 131 LBS | TEMPERATURE: 98.8 F | SYSTOLIC BLOOD PRESSURE: 122 MMHG | HEART RATE: 100 BPM | BODY MASS INDEX: 21.05 KG/M2 | DIASTOLIC BLOOD PRESSURE: 80 MMHG

## 2025-06-09 DIAGNOSIS — E05.90 SUBCLINICAL HYPERTHYROIDISM: Primary | ICD-10-CM

## 2025-06-09 PROCEDURE — 99213 OFFICE O/P EST LOW 20 MIN: CPT | Performed by: FAMILY MEDICINE

## 2025-06-09 NOTE — LETTER
June 9, 2025     Patient: Claudia Smalls  YOB: 1990  Date of Visit: 6/9/2025      To Whom it May Concern:    Claudia Smalls is under my professional care. Claudia was seen in my office on 6/9/2025. Claudia has a diagnosis of Celiac's disease and needs to bring own gluten free food and drink to all public events.     If you have any questions or concerns, please don't hesitate to call.         Sincerely,          Kinjal Faith,         CC: No Recipients

## 2025-06-11 NOTE — PROGRESS NOTES
"Name: Claudia Smalls      : 1990      MRN: [id number]  Encounter Provider: Kinjal Faith DO  Encounter Date: 2025   Encounter department: [facility]  :  Assessment & Plan  Subclinical hyperthyroidism    Recommend repeat labs for further evaluation regarding this.  Recommend follow-up in 6 months regarding annual physical exam or sooner as needed.    Orders:  •  TSH, 3rd generation with Free T4 reflex; Future  •  T3, free; Future           History of Present Illness   Claudia is a 34-year-old female with past medical history of celiac's disease, GERD, JOSE-3 who presents today for 6-month follow-up regarding chronic conditions.  Repeat labs have shown significant improvement in her folate and B12 levels.  Her vitamin D levels are also in normal range now.  She did have subclinical hyperthyroidism in her November labs and she is due for a thyroid recheck.      Review of Systems   Constitutional:  Negative for fatigue and fever.   HENT:  Negative for congestion and sore throat.    Respiratory:  Negative for cough and shortness of breath.    Cardiovascular:  Negative for chest pain and palpitations.   Gastrointestinal:  Negative for abdominal pain, blood in stool, constipation, diarrhea, nausea and vomiting.   Genitourinary:  Negative for dysuria and hematuria.   Musculoskeletal:  Negative for arthralgias and myalgias.   Skin:  Negative for rash.   Neurological:  Negative for dizziness and headaches.   Psychiatric/Behavioral:  Negative for dysphoric mood. The patient is not nervous/anxious.        Objective   /80 (BP Location: Left arm, Patient Position: Sitting, Cuff Size: Standard)   Pulse 100   Temp 98.8 °F (37.1 °C) (Tympanic)   Ht 5' 5.75\" (1.67 m)   Wt 59.4 kg (131 lb)   SpO2 98%   BMI 21.31 kg/m²      Physical Exam  Vitals reviewed.   Constitutional:       General: She is not in acute distress.     Appearance: She is well-developed.   HENT:      Head: " Normocephalic and atraumatic.      Right Ear: External ear normal.      Left Ear: External ear normal.     Eyes:      Conjunctiva/sclera: Conjunctivae normal.       Cardiovascular:      Rate and Rhythm: Normal rate and regular rhythm.      Heart sounds: No murmur heard.  Pulmonary:      Effort: Pulmonary effort is normal. No respiratory distress.      Breath sounds: Normal breath sounds.   Abdominal:      General: Bowel sounds are normal.      Palpations: Abdomen is soft.      Tenderness: There is no abdominal tenderness.     Musculoskeletal:      Right lower leg: No edema.      Left lower leg: No edema.     Skin:     General: Skin is warm and dry.     Neurological:      General: No focal deficit present.      Mental Status: She is alert and oriented to person, place, and time.     Psychiatric:         Mood and Affect: Mood normal.         Behavior: Behavior normal.

## (undated) DEVICE — PVC URETHRAL CATHETER: Brand: DOVER

## (undated) DEVICE — SYRINGE 10ML LL

## (undated) DEVICE — PENCIL ELECTROSURG E-Z CLEAN -0035H

## (undated) DEVICE — 1820 FOAM BLOCK NEEDLE COUNTER: Brand: DEVON

## (undated) DEVICE — BETHLEHEM UNIVERSAL MINOR VAG: Brand: CARDINAL HEALTH

## (undated) DEVICE — SUT VICRYL 0 CT-1 27 IN J260H

## (undated) DEVICE — STRL COTTON TIP APPLCTR 6IN PK: Brand: CARDINAL HEALTH

## (undated) DEVICE — SPECIMEN CONTAINER STERILE PEEL PACK

## (undated) DEVICE — SURGIFOAM 7 X 12 SPONGE ABS

## (undated) DEVICE — STANDARD SURGICAL GOWN, L: Brand: CONVERTORS

## (undated) DEVICE — GLOVE PI ULTRA TOUCH SZ.7.5

## (undated) DEVICE — STERILE 8 INCH PROCTO SWAB: Brand: CARDINAL HEALTH

## (undated) DEVICE — MEDI-VAC YANK SUCT HNDL W/TPRD BULBOUS TIP: Brand: CARDINAL HEALTH

## (undated) DEVICE — 3000CC GUARDIAN II: Brand: GUARDIAN